# Patient Record
Sex: MALE | Race: WHITE | NOT HISPANIC OR LATINO | URBAN - METROPOLITAN AREA
[De-identification: names, ages, dates, MRNs, and addresses within clinical notes are randomized per-mention and may not be internally consistent; named-entity substitution may affect disease eponyms.]

---

## 2017-01-11 ENCOUNTER — COMMUNICATION - HEALTHEAST (OUTPATIENT)
Dept: FAMILY MEDICINE | Facility: CLINIC | Age: 52
End: 2017-01-11

## 2017-01-11 DIAGNOSIS — I10 HTN (HYPERTENSION): ICD-10-CM

## 2017-04-14 ENCOUNTER — COMMUNICATION - HEALTHEAST (OUTPATIENT)
Dept: FAMILY MEDICINE | Facility: CLINIC | Age: 52
End: 2017-04-14

## 2017-04-18 ENCOUNTER — COMMUNICATION - HEALTHEAST (OUTPATIENT)
Dept: FAMILY MEDICINE | Facility: CLINIC | Age: 52
End: 2017-04-18

## 2017-04-18 DIAGNOSIS — I10 ESSENTIAL HYPERTENSION: ICD-10-CM

## 2017-04-21 ENCOUNTER — COMMUNICATION - HEALTHEAST (OUTPATIENT)
Dept: SCHEDULING | Facility: CLINIC | Age: 52
End: 2017-04-21

## 2017-04-21 DIAGNOSIS — I10 HTN (HYPERTENSION): ICD-10-CM

## 2017-05-09 ENCOUNTER — COMMUNICATION - HEALTHEAST (OUTPATIENT)
Dept: FAMILY MEDICINE | Facility: CLINIC | Age: 52
End: 2017-05-09

## 2017-08-07 ENCOUNTER — COMMUNICATION - HEALTHEAST (OUTPATIENT)
Dept: FAMILY MEDICINE | Facility: CLINIC | Age: 52
End: 2017-08-07

## 2017-10-16 ENCOUNTER — OFFICE VISIT - HEALTHEAST (OUTPATIENT)
Dept: FAMILY MEDICINE | Facility: CLINIC | Age: 52
End: 2017-10-16

## 2017-10-16 DIAGNOSIS — D48.5 NEOPLASM OF UNCERTAIN BEHAVIOR OF SKIN: ICD-10-CM

## 2017-10-16 DIAGNOSIS — I10 ACCELERATED HYPERTENSION: ICD-10-CM

## 2017-10-16 DIAGNOSIS — Z83.49 FAMILY HISTORY OF DIABETES INSIPIDUS: ICD-10-CM

## 2017-10-16 DIAGNOSIS — E55.9 VITAMIN D DEFICIENCY: ICD-10-CM

## 2017-10-16 DIAGNOSIS — Z00.00 WELL ADULT EXAM: ICD-10-CM

## 2017-10-16 DIAGNOSIS — Z12.5 SCREENING FOR PROSTATE CANCER: ICD-10-CM

## 2017-10-16 DIAGNOSIS — D23.9 DYSPLASTIC NEVUS: ICD-10-CM

## 2017-10-16 DIAGNOSIS — I10 ESSENTIAL HYPERTENSION: ICD-10-CM

## 2017-10-16 LAB
HBA1C MFR BLD: 5.6 % (ref 3.5–6)
PSA SERPL-MCNC: 1.5 NG/ML (ref 0–3.5)

## 2017-10-16 RX ORDER — CHLORAL HYDRATE 500 MG
1000 CAPSULE ORAL DAILY
Status: SHIPPED | COMMUNITY
Start: 2017-10-16

## 2017-10-16 RX ORDER — MULTIVITAMIN WITH IRON
TABLET ORAL 2 TIMES DAILY
Status: SHIPPED | COMMUNITY
Start: 2017-10-16

## 2017-10-16 RX ORDER — NIACIN 500 MG
500 TABLET ORAL
Status: SHIPPED | COMMUNITY
Start: 2017-10-16

## 2017-10-16 RX ORDER — UBIDECARENONE 200 MG
200 CAPSULE ORAL 2 TIMES DAILY
Status: SHIPPED | COMMUNITY
Start: 2017-10-16

## 2017-10-16 ASSESSMENT — MIFFLIN-ST. JEOR: SCORE: 2042.81

## 2017-10-18 ENCOUNTER — COMMUNICATION - HEALTHEAST (OUTPATIENT)
Dept: FAMILY MEDICINE | Facility: CLINIC | Age: 52
End: 2017-10-18

## 2017-10-19 ENCOUNTER — COMMUNICATION - HEALTHEAST (OUTPATIENT)
Dept: FAMILY MEDICINE | Facility: CLINIC | Age: 52
End: 2017-10-19

## 2017-10-19 ENCOUNTER — AMBULATORY - HEALTHEAST (OUTPATIENT)
Dept: FAMILY MEDICINE | Facility: CLINIC | Age: 52
End: 2017-10-19

## 2017-10-19 DIAGNOSIS — D48.5 NEOPLASM OF UNCERTAIN BEHAVIOR OF SKIN: ICD-10-CM

## 2017-10-20 ENCOUNTER — AMBULATORY - HEALTHEAST (OUTPATIENT)
Dept: FAMILY MEDICINE | Facility: CLINIC | Age: 52
End: 2017-10-20

## 2017-10-20 ENCOUNTER — COMMUNICATION - HEALTHEAST (OUTPATIENT)
Dept: FAMILY MEDICINE | Facility: CLINIC | Age: 52
End: 2017-10-20

## 2017-10-20 DIAGNOSIS — I10 ACCELERATED HYPERTENSION: ICD-10-CM

## 2017-10-23 ENCOUNTER — AMBULATORY - HEALTHEAST (OUTPATIENT)
Dept: FAMILY MEDICINE | Facility: CLINIC | Age: 52
End: 2017-10-23

## 2017-10-23 DIAGNOSIS — I10 ACCELERATED HYPERTENSION: ICD-10-CM

## 2017-10-25 ENCOUNTER — COMMUNICATION - HEALTHEAST (OUTPATIENT)
Dept: FAMILY MEDICINE | Facility: CLINIC | Age: 52
End: 2017-10-25

## 2017-10-27 ENCOUNTER — COMMUNICATION - HEALTHEAST (OUTPATIENT)
Dept: FAMILY MEDICINE | Facility: CLINIC | Age: 52
End: 2017-10-27

## 2017-11-02 ENCOUNTER — COMMUNICATION - HEALTHEAST (OUTPATIENT)
Dept: FAMILY MEDICINE | Facility: CLINIC | Age: 52
End: 2017-11-02

## 2017-11-08 ENCOUNTER — RECORDS - HEALTHEAST (OUTPATIENT)
Dept: ADMINISTRATIVE | Facility: OTHER | Age: 52
End: 2017-11-08

## 2017-11-12 ENCOUNTER — COMMUNICATION - HEALTHEAST (OUTPATIENT)
Dept: FAMILY MEDICINE | Facility: CLINIC | Age: 52
End: 2017-11-12

## 2017-11-13 ENCOUNTER — AMBULATORY - HEALTHEAST (OUTPATIENT)
Dept: FAMILY MEDICINE | Facility: CLINIC | Age: 52
End: 2017-11-13

## 2017-11-13 DIAGNOSIS — M54.50 LOW BACK PAIN: ICD-10-CM

## 2017-11-14 ENCOUNTER — COMMUNICATION - HEALTHEAST (OUTPATIENT)
Dept: FAMILY MEDICINE | Facility: CLINIC | Age: 52
End: 2017-11-14

## 2017-11-14 ENCOUNTER — RECORDS - HEALTHEAST (OUTPATIENT)
Dept: ADMINISTRATIVE | Facility: OTHER | Age: 52
End: 2017-11-14

## 2017-11-25 ENCOUNTER — COMMUNICATION - HEALTHEAST (OUTPATIENT)
Dept: FAMILY MEDICINE | Facility: CLINIC | Age: 52
End: 2017-11-25

## 2017-12-01 ENCOUNTER — COMMUNICATION - HEALTHEAST (OUTPATIENT)
Dept: FAMILY MEDICINE | Facility: CLINIC | Age: 52
End: 2017-12-01

## 2018-01-22 ENCOUNTER — COMMUNICATION - HEALTHEAST (OUTPATIENT)
Dept: FAMILY MEDICINE | Facility: CLINIC | Age: 53
End: 2018-01-22

## 2018-02-06 ENCOUNTER — HOSPITAL ENCOUNTER (OUTPATIENT)
Dept: PHYSICAL MEDICINE AND REHAB | Facility: CLINIC | Age: 53
Discharge: HOME | End: 2018-02-06
Attending: PHYSICIAN ASSISTANT

## 2018-02-06 DIAGNOSIS — G47.9 SLEEP DISTURBANCE: ICD-10-CM

## 2018-02-06 DIAGNOSIS — M54.50 LUMBALGIA: ICD-10-CM

## 2018-02-06 DIAGNOSIS — M79.18 MYOFASCIAL PAIN: ICD-10-CM

## 2018-02-06 DIAGNOSIS — M53.3 SI (SACROILIAC) PAIN: ICD-10-CM

## 2018-02-06 DIAGNOSIS — M54.16 LUMBAR RADICULITIS: ICD-10-CM

## 2018-02-09 ENCOUNTER — COMMUNICATION - HEALTHEAST (OUTPATIENT)
Dept: PHYSICAL MEDICINE AND REHAB | Facility: CLINIC | Age: 53
End: 2018-02-09

## 2018-02-09 DIAGNOSIS — M54.16 LUMBAR RADICULITIS: ICD-10-CM

## 2018-02-09 DIAGNOSIS — M54.50 LUMBALGIA: ICD-10-CM

## 2018-02-09 DIAGNOSIS — M53.3 DISORDER OF SI (SACROILIAC) JOINT: ICD-10-CM

## 2018-02-13 ENCOUNTER — COMMUNICATION - HEALTHEAST (OUTPATIENT)
Dept: FAMILY MEDICINE | Facility: CLINIC | Age: 53
End: 2018-02-13

## 2018-02-14 ENCOUNTER — AMBULATORY - HEALTHEAST (OUTPATIENT)
Dept: FAMILY MEDICINE | Facility: CLINIC | Age: 53
End: 2018-02-14

## 2018-02-14 DIAGNOSIS — E78.5 DYSLIPIDEMIA: ICD-10-CM

## 2018-02-14 DIAGNOSIS — Z91.89 AT RISK FOR CORONARY ARTERY DISEASE: ICD-10-CM

## 2018-02-14 DIAGNOSIS — I10 HYPERTENSION: ICD-10-CM

## 2018-03-08 ENCOUNTER — COMMUNICATION - HEALTHEAST (OUTPATIENT)
Dept: FAMILY MEDICINE | Facility: CLINIC | Age: 53
End: 2018-03-08

## 2018-03-12 ENCOUNTER — THERAPY VISIT (OUTPATIENT)
Dept: PHYSICAL THERAPY | Facility: CLINIC | Age: 53
End: 2018-03-12
Payer: COMMERCIAL

## 2018-03-12 DIAGNOSIS — M54.16 LUMBAR RADICULOPATHY: Primary | ICD-10-CM

## 2018-03-12 PROCEDURE — 97110 THERAPEUTIC EXERCISES: CPT | Mod: GP | Performed by: PHYSICAL THERAPIST

## 2018-03-12 PROCEDURE — 97161 PT EVAL LOW COMPLEX 20 MIN: CPT | Mod: GP | Performed by: PHYSICAL THERAPIST

## 2018-03-12 NOTE — LETTER
[facility] ATHLETIC [facility]  [address]  [place] [address]  [phone]    2018    Re: Herbert Elizalde   :   1965  MRN:  [id number]   REFERRING PHYSICIAN:   Nain Ramires    [facility] ATHLETIC [facility]    Date of Initial Evaluation:  2018  Visits:  Rxs Used: 1  Reason for Referral:  Lumbar radiculopathy    EVALUATION SUMMARY    [facility]  Subjective:  Patient is a 52 year old male presenting with rehab back hpi. The history is provided by the patient. No  was used.   Herbert Elizalde is a 52 year old male with a lumbar condition.  Condition occurred with:  Insidious onset.  Condition occurred: for unknown reasons.  This is a chronic condition  Patient is reporting chronic L sided low back pain that radiates to the L buttock and partially down the posterior thigh. States the pain became more frequent 2017. States he wakes up at night at least 2-3 times, sleeps on his back. Is able to sit for 1 hour, then adjusts due to pain radiating down the leg. Feels pain almost immediately upon standing. Occasionally reports pain while walking.     Taking gabapentin 2x/daily for the pain and muscle relaxer as needed..    Patient reports pain:  Lumbar spine left.  Radiates to:  Gluteals left and thigh left.  Pain is described as sharp and shooting and is constant and reported as 5/10.  Associated symptoms:  Loss of motion/stiffness. Pain is worse during the night.  Symptoms are exacerbated by sitting, standing, walking, lifting, bending and lying down and relieved by heat.  Since onset symptoms are gradually worsening.        General health as reported by patient is good.  Pertinent medical history includes:  High blood pressure and cancer.  Medical allergies: yes (Amoxacillian).  Other surgeries include:  No.  Current medications:  Muscle relaxants, high blood pressure medication and pain  medication.  Current occupation is Animal care.  Patient is working in normal job without restrictions.  Primary job tasks include:  Lifting, driving, repetitive tasks and other (pulling).  Barriers include:  None as reported by the patient.  Red flags:  None as reported by the patient.    Objective:  Standing Alignment:    Shoulder/UE:  Rounded shoulders  Lumbar:  Lordosis decr            Re: Herbert Elizalde   :   1965        Lumbar/SI Evaluation  ROM:  Arom wnl lumbar: ELOY: no change in symptoms, improved ROM; REIL: increased ROM,   AROM Lumbar:   Flexion:        Distal shins  Ext:                    15%   Side Bend:        Left:     Right:   Rotation:           Left:     Right:   Side Glide:        Left:     Right:   Strength: TrA Contraction: poor  Lumbar Myotomes:  normal  Neural Tension/Mobility:    Left side:  SLR w/DF positive.   Lumbar Palpation:    Tenderness present at Left:    PSIS    Assessment/Plan:    Patient is a 52 year old male with lumbar complaints.    Patient has the following significant findings with corresponding treatment plan.                Diagnosis 1:  L sided lumbar radiculopathy  Pain -  hot/cold therapy, manual therapy, education, directional preference exercise and home program  Decreased ROM/flexibility - manual therapy and therapeutic exercise  Decreased strength - therapeutic exercise and therapeutic activities  Impaired muscle performance - neuro re-education  Decreased function - therapeutic activities  Impaired posture - neuro re-education    Therapy Evaluation Codes:   1) History comprised of:   Personal factors that impact the plan of care:      Time since onset of symptoms.    Comorbidity factors that impact the plan of care are:      Cancer and High blood pressure.     Medications impacting care: High blood pressure, Muscle relaxant and Pain.  2) Examination of Body Systems comprised of:   Body structures and functions that impact the plan of care:      Lumbar  spine.   Activity limitations that impact the plan of care are:      Bending, Dressing, Lifting, Sitting, Standing, Walking, Sleeping and Laying down.  3) Clinical presentation characteristics are:   Stable/Uncomplicated.  4) Decision-Making    Low complexity using standardized patient assessment instrument and/or measureable assessment of functional outcome.  Cumulative Therapy Evaluation is: Low complexity.    Previous and current functional limitations:  (See Goal Flow Sheet for this information)    Short term and Long term goals: (See Goal Flow Sheet for this information)       Re: Herbert Elizalde   :   1965    Communication ability:  Patient appears to be able to clearly communicate and understand verbal and written communication and follow directions correctly.  Treatment Explanation - The following has been discussed with the patient:   RX ordered/plan of care  Anticipated outcomes  Possible risks and side effects  This patient would benefit from PT intervention to resume normal activities.   Rehab potential is good.    Frequency:  1 X week, once daily  Duration:  for 6 weeks  Discharge Plan:  Achieve all LTG.  Independent in home treatment program.  Reach maximal therapeutic benefit.          Thank you for your referral.    INQUIRIES  Therapist: Tere Padilla PT   [facility]  [address] [place] [address] [address] [place] [id number]  Phone: [phone]  Fax: [phone]

## 2018-03-12 NOTE — PROGRESS NOTES
[facility] Initial Evaluation  Subjective:  Patient is a 52 year old male presenting with rehab back hpi. The history is provided by the patient. No  was used.   Herbert Elizalde is a 52 year old male with a lumbar condition.  Condition occurred with:  Insidious onset.  Condition occurred: for unknown reasons.  This is a chronic condition  Patient is reporting chronic L sided low back pain that radiates to the L buttock and partially down the posterior thigh. States the pain became more frequent December 2017. States he wakes up at night at least 2-3 times, sleeps on his back. Is able to sit for 1 hour, then adjusts due to pain radiating down the leg. Feels pain almost immediately upon standing. Occasionally reports pain while walking.     Taking gabapentin 2x/daily for the pain and muscle relaxer as needed..    Patient reports pain:  Lumbar spine left.  Radiates to:  Gluteals left and thigh left.  Pain is described as sharp and shooting and is constant and reported as 5/10.  Associated symptoms:  Loss of motion/stiffness. Pain is worse during the night.  Symptoms are exacerbated by sitting, standing, walking, lifting, bending and lying down and relieved by heat.  Since onset symptoms are gradually worsening.        General health as reported by patient is good.  Pertinent medical history includes:  High blood pressure and cancer.  Medical allergies: yes (Amoxacillian).  Other surgeries include:  No.  Current medications:  Muscle relaxants, high blood pressure medication and pain medication.  Current occupation is Animal care.  Patient is working in normal job without restrictions.  Primary job tasks include:  Lifting, driving, repetitive tasks and other (pulling).    Barriers include:  None as reported by the patient.    Red flags:  None as reported by the patient.                        Objective:  Standing Alignment:      Shoulder/UE:  Rounded shoulders  Lumbar:  Lordosis  decr                           Lumbar/SI Evaluation  ROM:  Arom wnl lumbar: ELOY: no change in symptoms, improved ROM; REIL: increased ROM,   AROM Lumbar:   Flexion:        Distal shins  Ext:                    15%   Side Bend:        Left:     Right:   Rotation:           Left:     Right:   Side Glide:        Left:     Right:         Strength: TrA Contraction: poor  Lumbar Myotomes:  normal                  Neural Tension/Mobility:    Left side:  SLR w/DF positive.     Lumbar Palpation:    Tenderness present at Left:    PSIS                                                       General     ROS    Assessment/Plan:    Patient is a 52 year old male with lumbar complaints.    Patient has the following significant findings with corresponding treatment plan.                Diagnosis 1:  L sided lumbar radiculopathy  Pain -  hot/cold therapy, manual therapy, education, directional preference exercise and home program  Decreased ROM/flexibility - manual therapy and therapeutic exercise  Decreased strength - therapeutic exercise and therapeutic activities  Impaired muscle performance - neuro re-education  Decreased function - therapeutic activities  Impaired posture - neuro re-education    Therapy Evaluation Codes:   1) History comprised of:   Personal factors that impact the plan of care:      Time since onset of symptoms.    Comorbidity factors that impact the plan of care are:      Cancer and High blood pressure.     Medications impacting care: High blood pressure, Muscle relaxant and Pain.  2) Examination of Body Systems comprised of:   Body structures and functions that impact the plan of care:      Lumbar spine.   Activity limitations that impact the plan of care are:      Bending, Dressing, Lifting, Sitting, Standing, Walking, Sleeping and Laying down.  3) Clinical presentation characteristics are:   Stable/Uncomplicated.  4) Decision-Making    Low complexity using standardized patient assessment instrument and/or  measureable assessment of functional outcome.  Cumulative Therapy Evaluation is: Low complexity.    Previous and current functional limitations:  (See Goal Flow Sheet for this information)    Short term and Long term goals: (See Goal Flow Sheet for this information)     Communication ability:  Patient appears to be able to clearly communicate and understand verbal and written communication and follow directions correctly.  Treatment Explanation - The following has been discussed with the patient:   RX ordered/plan of care  Anticipated outcomes  Possible risks and side effects  This patient would benefit from PT intervention to resume normal activities.   Rehab potential is good.    Frequency:  1 X week, once daily  Duration:  for 6 weeks  Discharge Plan:  Achieve all LTG.  Independent in home treatment program.  Reach maximal therapeutic benefit.    Please refer to the daily flowsheet for treatment today, total treatment time and time spent performing 1:1 timed codes.

## 2018-03-16 ENCOUNTER — COMMUNICATION - HEALTHEAST (OUTPATIENT)
Dept: FAMILY MEDICINE | Facility: CLINIC | Age: 53
End: 2018-03-16

## 2018-03-27 ENCOUNTER — COMMUNICATION - HEALTHEAST (OUTPATIENT)
Dept: SCHEDULING | Facility: CLINIC | Age: 53
End: 2018-03-27

## 2018-03-27 ENCOUNTER — COMMUNICATION - HEALTHEAST (OUTPATIENT)
Dept: FAMILY MEDICINE | Facility: CLINIC | Age: 53
End: 2018-03-27

## 2018-03-27 DIAGNOSIS — I10 HTN (HYPERTENSION): ICD-10-CM

## 2018-04-28 ENCOUNTER — COMMUNICATION - HEALTHEAST (OUTPATIENT)
Dept: FAMILY MEDICINE | Facility: CLINIC | Age: 53
End: 2018-04-28

## 2018-05-01 ENCOUNTER — AMBULATORY - HEALTHEAST (OUTPATIENT)
Dept: FAMILY MEDICINE | Facility: CLINIC | Age: 53
End: 2018-05-01

## 2018-05-01 DIAGNOSIS — M79.673 FOOT PAIN: ICD-10-CM

## 2018-05-07 ENCOUNTER — RECORDS - HEALTHEAST (OUTPATIENT)
Dept: ADMINISTRATIVE | Facility: OTHER | Age: 53
End: 2018-05-07

## 2018-05-27 ENCOUNTER — COMMUNICATION - HEALTHEAST (OUTPATIENT)
Dept: PHYSICAL MEDICINE AND REHAB | Facility: CLINIC | Age: 53
End: 2018-05-27

## 2018-05-27 DIAGNOSIS — M54.50 LUMBALGIA: ICD-10-CM

## 2018-05-27 DIAGNOSIS — M53.3 SI (SACROILIAC) PAIN: ICD-10-CM

## 2018-05-27 DIAGNOSIS — M54.16 LUMBAR RADICULITIS: ICD-10-CM

## 2018-05-27 DIAGNOSIS — G47.9 SLEEP DISTURBANCE: ICD-10-CM

## 2018-05-27 DIAGNOSIS — M79.18 MYOFASCIAL PAIN: ICD-10-CM

## 2018-05-29 ENCOUNTER — COMMUNICATION - HEALTHEAST (OUTPATIENT)
Dept: FAMILY MEDICINE | Facility: CLINIC | Age: 53
End: 2018-05-29

## 2018-06-01 ENCOUNTER — OFFICE VISIT - HEALTHEAST (OUTPATIENT)
Dept: FAMILY MEDICINE | Facility: CLINIC | Age: 53
End: 2018-06-01

## 2018-06-01 DIAGNOSIS — I10 ESSENTIAL HYPERTENSION: ICD-10-CM

## 2018-06-01 DIAGNOSIS — M77.00 MEDIAL EPICONDYLITIS: ICD-10-CM

## 2018-06-01 DIAGNOSIS — I87.8 VENOUS STASIS: ICD-10-CM

## 2018-06-01 ASSESSMENT — MIFFLIN-ST. JEOR: SCORE: 2046.22

## 2018-06-26 ENCOUNTER — COMMUNICATION - HEALTHEAST (OUTPATIENT)
Dept: FAMILY MEDICINE | Facility: CLINIC | Age: 53
End: 2018-06-26

## 2018-06-27 ENCOUNTER — COMMUNICATION - HEALTHEAST (OUTPATIENT)
Dept: FAMILY MEDICINE | Facility: CLINIC | Age: 53
End: 2018-06-27

## 2018-06-27 DIAGNOSIS — I10 ESSENTIAL HYPERTENSION: ICD-10-CM

## 2018-08-07 PROBLEM — M54.16 LUMBAR RADICULOPATHY: Status: RESOLVED | Noted: 2018-03-12 | Resolved: 2018-08-07

## 2018-08-25 ENCOUNTER — COMMUNICATION - HEALTHEAST (OUTPATIENT)
Dept: FAMILY MEDICINE | Facility: CLINIC | Age: 53
End: 2018-08-25

## 2018-08-25 DIAGNOSIS — I10 HTN (HYPERTENSION): ICD-10-CM

## 2018-10-16 ENCOUNTER — COMMUNICATION - HEALTHEAST (OUTPATIENT)
Dept: FAMILY MEDICINE | Facility: CLINIC | Age: 53
End: 2018-10-16

## 2018-10-17 ENCOUNTER — COMMUNICATION - HEALTHEAST (OUTPATIENT)
Dept: FAMILY MEDICINE | Facility: CLINIC | Age: 53
End: 2018-10-17

## 2018-11-05 ENCOUNTER — RECORDS - HEALTHEAST (OUTPATIENT)
Dept: ADMINISTRATIVE | Facility: OTHER | Age: 53
End: 2018-11-05

## 2018-11-30 ENCOUNTER — OFFICE VISIT - HEALTHEAST (OUTPATIENT)
Dept: FAMILY MEDICINE | Facility: CLINIC | Age: 53
End: 2018-11-30

## 2018-11-30 DIAGNOSIS — Z12.5 SCREENING FOR PROSTATE CANCER: ICD-10-CM

## 2018-11-30 DIAGNOSIS — Z20.9 EXPOSURE TO POTENTIAL INFECTION: ICD-10-CM

## 2018-11-30 DIAGNOSIS — I10 ESSENTIAL HYPERTENSION: ICD-10-CM

## 2018-11-30 DIAGNOSIS — Z00.00 WELL ADULT EXAM: ICD-10-CM

## 2018-11-30 DIAGNOSIS — G47.9 SLEEP DISORDER: ICD-10-CM

## 2018-11-30 LAB
ALBUMIN SERPL-MCNC: 4.6 G/DL (ref 3.5–5)
ALBUMIN UR-MCNC: NEGATIVE MG/DL
ALP SERPL-CCNC: 71 U/L (ref 45–120)
ALT SERPL W P-5'-P-CCNC: 25 U/L (ref 0–45)
ANION GAP SERPL CALCULATED.3IONS-SCNC: 14 MMOL/L (ref 5–18)
APPEARANCE UR: CLEAR
AST SERPL W P-5'-P-CCNC: 22 U/L (ref 0–40)
BASOPHILS # BLD AUTO: 0.1 THOU/UL (ref 0–0.2)
BASOPHILS NFR BLD AUTO: 1 % (ref 0–2)
BILIRUB SERPL-MCNC: 0.8 MG/DL (ref 0–1)
BILIRUB UR QL STRIP: NEGATIVE
BUN SERPL-MCNC: 23 MG/DL (ref 8–22)
CALCIUM SERPL-MCNC: 10.7 MG/DL (ref 8.5–10.5)
CHLORIDE BLD-SCNC: 100 MMOL/L (ref 98–107)
CO2 SERPL-SCNC: 27 MMOL/L (ref 22–31)
COLOR UR AUTO: YELLOW
CREAT SERPL-MCNC: 0.96 MG/DL (ref 0.7–1.3)
CRP SERPL HS-MCNC: 3.7 MG/L (ref 0–3)
EOSINOPHIL # BLD AUTO: 0.3 THOU/UL (ref 0–0.4)
EOSINOPHIL NFR BLD AUTO: 3 % (ref 0–6)
ERYTHROCYTE [DISTWIDTH] IN BLOOD BY AUTOMATED COUNT: 12.2 % (ref 11–14.5)
GFR SERPL CREATININE-BSD FRML MDRD: >60 ML/MIN/1.73M2
GLUCOSE BLD-MCNC: 84 MG/DL (ref 70–125)
GLUCOSE UR STRIP-MCNC: NEGATIVE MG/DL
HBA1C MFR BLD: 5.7 % (ref 3.5–6)
HCT VFR BLD AUTO: 44.7 % (ref 40–54)
HGB BLD-MCNC: 14.9 G/DL (ref 14–18)
HGB UR QL STRIP: NEGATIVE
KETONES UR STRIP-MCNC: NEGATIVE MG/DL
LEUKOCYTE ESTERASE UR QL STRIP: NEGATIVE
LYMPHOCYTES # BLD AUTO: 1.3 THOU/UL (ref 0.8–4.4)
LYMPHOCYTES NFR BLD AUTO: 16 % (ref 20–40)
MCH RBC QN AUTO: 30.1 PG (ref 27–34)
MCHC RBC AUTO-ENTMCNC: 33.3 G/DL (ref 32–36)
MCV RBC AUTO: 90 FL (ref 80–100)
MONOCYTES # BLD AUTO: 0.5 THOU/UL (ref 0–0.9)
MONOCYTES NFR BLD AUTO: 7 % (ref 2–10)
NEUTROPHILS # BLD AUTO: 6 THOU/UL (ref 2–7.7)
NEUTROPHILS NFR BLD AUTO: 73 % (ref 50–70)
NITRATE UR QL: NEGATIVE
PH UR STRIP: 7 [PH] (ref 5–8)
PLATELET # BLD AUTO: 444 THOU/UL (ref 140–440)
PMV BLD AUTO: 6.8 FL (ref 7–10)
POTASSIUM BLD-SCNC: 4.2 MMOL/L (ref 3.5–5)
PROT SERPL-MCNC: 7.5 G/DL (ref 6–8)
PSA SERPL-MCNC: 1.7 NG/ML (ref 0–3.5)
RBC # BLD AUTO: 4.95 MILL/UL (ref 4.4–6.2)
SODIUM SERPL-SCNC: 141 MMOL/L (ref 136–145)
SP GR UR STRIP: 1.01 (ref 1–1.03)
TSH SERPL DL<=0.005 MIU/L-ACNC: 2.57 UIU/ML (ref 0.3–5)
UROBILINOGEN UR STRIP-ACNC: NORMAL
VIT B12 SERPL-MCNC: 670 PG/ML (ref 213–816)
WBC: 8.2 THOU/UL (ref 4–11)

## 2018-12-01 LAB — HCV AB SERPL QL IA: NEGATIVE

## 2018-12-02 LAB — DHEA-S SERPL-MCNC: 213 UG/DL (ref 70–310)

## 2018-12-03 LAB
25(OH)D3 SERPL-MCNC: 58.9 NG/ML (ref 30–80)
CHOLEST SERPL-MCNC: 214 MG/DL
HDL SERPL QN: 8.2 NM
HDL SERPL-SCNC: 32.4 UMOL/L
HDLC SERPL-MCNC: 39 MG/DL (ref 40–59)
HLD.LARGE SERPL-SCNC: ABNORMAL UMOL/L
LDL SERPL QN: 20.3 NM
LDL SERPL-SCNC: 1966 NMOL/L
LDL SMALL SERPL-SCNC: >1085 NMOL/L
LDLC SERPL CALC-MCNC: 138 MG/DL
LIPOPROTEIN (A) - HISTORICAL: 2 MG/DL (ref 0–30)
PATHOLOGY STUDY: ABNORMAL
TRIGL SERPL-MCNC: 185 MG/DL (ref 30–149)
VLDL LARGE SERPL-SCNC: 7.2 NMOL/L
VLDL SERPL QN: 54.7 NM

## 2018-12-04 LAB
FASTING STATUS PATIENT QL REPORTED: YES
HOMOCYSTEINE PLASMA - HISTORICAL: 11 UMOL/L (ref 0–13)

## 2018-12-05 ENCOUNTER — COMMUNICATION - HEALTHEAST (OUTPATIENT)
Dept: FAMILY MEDICINE | Facility: CLINIC | Age: 53
End: 2018-12-05

## 2018-12-06 ENCOUNTER — COMMUNICATION - HEALTHEAST (OUTPATIENT)
Dept: FAMILY MEDICINE | Facility: CLINIC | Age: 53
End: 2018-12-06

## 2018-12-31 ENCOUNTER — COMMUNICATION - HEALTHEAST (OUTPATIENT)
Dept: FAMILY MEDICINE | Facility: CLINIC | Age: 53
End: 2018-12-31

## 2018-12-31 DIAGNOSIS — I10 HTN (HYPERTENSION): ICD-10-CM

## 2019-11-28 ENCOUNTER — COMMUNICATION - HEALTHEAST (OUTPATIENT)
Dept: FAMILY MEDICINE | Facility: CLINIC | Age: 54
End: 2019-11-28

## 2019-11-28 DIAGNOSIS — I10 HTN (HYPERTENSION): ICD-10-CM

## 2019-12-03 ENCOUNTER — COMMUNICATION - HEALTHEAST (OUTPATIENT)
Dept: FAMILY MEDICINE | Facility: CLINIC | Age: 54
End: 2019-12-03

## 2019-12-03 DIAGNOSIS — I10 HTN (HYPERTENSION): ICD-10-CM

## 2020-02-26 ENCOUNTER — COMMUNICATION - HEALTHEAST (OUTPATIENT)
Dept: FAMILY MEDICINE | Facility: CLINIC | Age: 55
End: 2020-02-26

## 2020-02-26 DIAGNOSIS — I10 HTN (HYPERTENSION): ICD-10-CM

## 2020-05-27 ENCOUNTER — COMMUNICATION - HEALTHEAST (OUTPATIENT)
Dept: FAMILY MEDICINE | Facility: CLINIC | Age: 55
End: 2020-05-27

## 2020-05-27 DIAGNOSIS — I10 HTN (HYPERTENSION): ICD-10-CM

## 2020-08-25 ENCOUNTER — COMMUNICATION - HEALTHEAST (OUTPATIENT)
Dept: FAMILY MEDICINE | Facility: CLINIC | Age: 55
End: 2020-08-25

## 2020-08-25 DIAGNOSIS — I10 HTN (HYPERTENSION): ICD-10-CM

## 2020-10-28 ENCOUNTER — RECORDS - HEALTHEAST (OUTPATIENT)
Dept: ADMINISTRATIVE | Facility: OTHER | Age: 55
End: 2020-10-28

## 2020-11-12 ENCOUNTER — COMMUNICATION - HEALTHEAST (OUTPATIENT)
Dept: FAMILY MEDICINE | Facility: CLINIC | Age: 55
End: 2020-11-12

## 2020-11-25 ENCOUNTER — COMMUNICATION - HEALTHEAST (OUTPATIENT)
Dept: FAMILY MEDICINE | Facility: CLINIC | Age: 55
End: 2020-11-25

## 2020-11-25 DIAGNOSIS — I10 HTN (HYPERTENSION): ICD-10-CM

## 2020-12-09 ENCOUNTER — COMMUNICATION - HEALTHEAST (OUTPATIENT)
Dept: FAMILY MEDICINE | Facility: CLINIC | Age: 55
End: 2020-12-09

## 2020-12-09 DIAGNOSIS — I10 HTN (HYPERTENSION): ICD-10-CM

## 2020-12-10 RX ORDER — AMLODIPINE BESYLATE 10 MG/1
TABLET ORAL
Qty: 90 TABLET | Refills: 3 | Status: SHIPPED | OUTPATIENT
Start: 2020-12-10 | End: 2021-12-24

## 2021-02-23 ENCOUNTER — COMMUNICATION - HEALTHEAST (OUTPATIENT)
Dept: FAMILY MEDICINE | Facility: CLINIC | Age: 56
End: 2021-02-23

## 2021-02-23 DIAGNOSIS — I10 HTN (HYPERTENSION): ICD-10-CM

## 2021-03-12 ENCOUNTER — COMMUNICATION - HEALTHEAST (OUTPATIENT)
Dept: FAMILY MEDICINE | Facility: CLINIC | Age: 56
End: 2021-03-12

## 2021-03-12 DIAGNOSIS — I10 HTN (HYPERTENSION): ICD-10-CM

## 2021-03-30 ENCOUNTER — COMMUNICATION - HEALTHEAST (OUTPATIENT)
Dept: FAMILY MEDICINE | Facility: CLINIC | Age: 56
End: 2021-03-30

## 2021-03-30 DIAGNOSIS — I10 HTN (HYPERTENSION): ICD-10-CM

## 2021-04-02 ENCOUNTER — COMMUNICATION - HEALTHEAST (OUTPATIENT)
Dept: FAMILY MEDICINE | Facility: CLINIC | Age: 56
End: 2021-04-02

## 2021-04-09 ENCOUNTER — COMMUNICATION - HEALTHEAST (OUTPATIENT)
Dept: FAMILY MEDICINE | Facility: CLINIC | Age: 56
End: 2021-04-09

## 2021-04-09 ENCOUNTER — OFFICE VISIT - HEALTHEAST (OUTPATIENT)
Dept: FAMILY MEDICINE | Facility: CLINIC | Age: 56
End: 2021-04-09

## 2021-04-09 DIAGNOSIS — E66.01 MORBID OBESITY (H): ICD-10-CM

## 2021-04-09 DIAGNOSIS — Z11.4 SCREENING FOR HIV (HUMAN IMMUNODEFICIENCY VIRUS): ICD-10-CM

## 2021-04-09 DIAGNOSIS — R60.0 LOCALIZED EDEMA: ICD-10-CM

## 2021-04-09 DIAGNOSIS — E55.9 VITAMIN D DEFICIENCY: ICD-10-CM

## 2021-04-09 DIAGNOSIS — I10 HTN (HYPERTENSION): ICD-10-CM

## 2021-04-09 DIAGNOSIS — Z12.5 SCREENING FOR PROSTATE CANCER: ICD-10-CM

## 2021-04-09 DIAGNOSIS — Z11.59 ENCOUNTER FOR HEPATITIS C SCREENING TEST FOR LOW RISK PATIENT: ICD-10-CM

## 2021-04-09 DIAGNOSIS — Z13.1 SCREENING FOR DIABETES MELLITUS: ICD-10-CM

## 2021-04-09 DIAGNOSIS — I10 ESSENTIAL HYPERTENSION: ICD-10-CM

## 2021-04-09 DIAGNOSIS — M25.471 RIGHT ANKLE SWELLING: ICD-10-CM

## 2021-04-09 DIAGNOSIS — Z53.20 ANTENATAL SCREENING FOR HEPATITIS B VIRUS DECLINED: ICD-10-CM

## 2021-04-09 LAB
ALBUMIN SERPL-MCNC: 4.8 G/DL (ref 3.5–5)
ALBUMIN UR-MCNC: NEGATIVE G/DL
ALP SERPL-CCNC: 72 U/L (ref 45–120)
ALT SERPL W P-5'-P-CCNC: 19 U/L (ref 0–45)
ANION GAP SERPL CALCULATED.3IONS-SCNC: 13 MMOL/L (ref 5–18)
APPEARANCE UR: CLEAR
AST SERPL W P-5'-P-CCNC: 18 U/L (ref 0–40)
BILIRUB SERPL-MCNC: 0.8 MG/DL (ref 0–1)
BILIRUB UR QL STRIP: NEGATIVE
BUN SERPL-MCNC: 24 MG/DL (ref 8–22)
CALCIUM SERPL-MCNC: 10.7 MG/DL (ref 8.5–10.5)
CHLORIDE BLD-SCNC: 101 MMOL/L (ref 98–107)
CHOLEST SERPL-MCNC: 195 MG/DL
CO2 SERPL-SCNC: 28 MMOL/L (ref 22–31)
COLOR UR AUTO: YELLOW
CREAT SERPL-MCNC: 1.2 MG/DL (ref 0.7–1.3)
ERYTHROCYTE [DISTWIDTH] IN BLOOD BY AUTOMATED COUNT: 13.1 % (ref 11–14.5)
FASTING STATUS PATIENT QL REPORTED: YES
GFR SERPL CREATININE-BSD FRML MDRD: >60 ML/MIN/1.73M2
GLUCOSE BLD-MCNC: 81 MG/DL (ref 70–125)
GLUCOSE UR STRIP-MCNC: NEGATIVE MG/DL
HBA1C MFR BLD: 5.7 %
HCT VFR BLD AUTO: 48.2 % (ref 40–54)
HDLC SERPL-MCNC: 37 MG/DL
HGB BLD-MCNC: 15.8 G/DL (ref 14–18)
HGB UR QL STRIP: NEGATIVE
HIV 1+2 AB+HIV1 P24 AG SERPL QL IA: NEGATIVE
KETONES UR STRIP-MCNC: NEGATIVE MG/DL
LDLC SERPL CALC-MCNC: 121 MG/DL
LEUKOCYTE ESTERASE UR QL STRIP: NEGATIVE
MCH RBC QN AUTO: 29.4 PG (ref 27–34)
MCHC RBC AUTO-ENTMCNC: 32.8 G/DL (ref 32–36)
MCV RBC AUTO: 90 FL (ref 80–100)
NITRATE UR QL: NEGATIVE
PH UR STRIP: 6.5 [PH] (ref 5–8)
PLATELET # BLD AUTO: 501 THOU/UL (ref 140–440)
PMV BLD AUTO: 8.8 FL (ref 7–10)
POTASSIUM BLD-SCNC: 4.7 MMOL/L (ref 3.5–5)
PROT SERPL-MCNC: 7.7 G/DL (ref 6–8)
PSA SERPL-MCNC: 2.1 NG/ML (ref 0–3.5)
RBC # BLD AUTO: 5.37 MILL/UL (ref 4.4–6.2)
SODIUM SERPL-SCNC: 142 MMOL/L (ref 136–145)
SP GR UR STRIP: 1.02 (ref 1–1.03)
TRIGL SERPL-MCNC: 184 MG/DL
TSH SERPL DL<=0.005 MIU/L-ACNC: 3.5 UIU/ML (ref 0.3–5)
UROBILINOGEN UR STRIP-ACNC: NORMAL
WBC: 7.9 THOU/UL (ref 4–11)

## 2021-04-09 ASSESSMENT — MIFFLIN-ST. JEOR: SCORE: 2092.71

## 2021-04-10 LAB — NT-PROBNP SERPL-MCNC: 14 PG/ML (ref 0–125)

## 2021-04-12 LAB
25(OH)D3 SERPL-MCNC: 62.3 NG/ML (ref 30–80)
25(OH)D3 SERPL-MCNC: 62.3 NG/ML (ref 30–80)
HCV AB SERPL QL IA: NEGATIVE

## 2021-04-13 ENCOUNTER — COMMUNICATION - HEALTHEAST (OUTPATIENT)
Dept: FAMILY MEDICINE | Facility: CLINIC | Age: 56
End: 2021-04-13

## 2021-04-13 DIAGNOSIS — I10 HTN (HYPERTENSION): ICD-10-CM

## 2021-04-13 RX ORDER — CARVEDILOL 25 MG/1
TABLET ORAL
Qty: 180 TABLET | Refills: 3 | Status: SHIPPED | OUTPATIENT
Start: 2021-04-13 | End: 2022-02-15

## 2021-04-23 ENCOUNTER — AMBULATORY - HEALTHEAST (OUTPATIENT)
Dept: NURSING | Facility: CLINIC | Age: 56
End: 2021-04-23

## 2021-05-14 ENCOUNTER — AMBULATORY - HEALTHEAST (OUTPATIENT)
Dept: NURSING | Facility: CLINIC | Age: 56
End: 2021-05-14

## 2021-05-26 ENCOUNTER — RECORDS - HEALTHEAST (OUTPATIENT)
Dept: ADMINISTRATIVE | Facility: CLINIC | Age: 56
End: 2021-05-26

## 2021-05-31 VITALS — WEIGHT: 253.75 LBS | HEIGHT: 74 IN | BODY MASS INDEX: 32.57 KG/M2

## 2021-05-31 VITALS — WEIGHT: 255 LBS | BODY MASS INDEX: 33.19 KG/M2

## 2021-06-01 VITALS — BODY MASS INDEX: 34.19 KG/M2 | WEIGHT: 258 LBS | HEIGHT: 73 IN

## 2021-06-02 VITALS — BODY MASS INDEX: 34.28 KG/M2 | WEIGHT: 256.25 LBS

## 2021-06-03 NOTE — TELEPHONE ENCOUNTER
Refill Approved    Rx renewed per Medication Renewal Policy. Medication was last renewed on 12/31/2018.    Sonny Nunes, [facility] Triage/Med Refill 11/29/2019     Requested Prescriptions   Pending Prescriptions Disp Refills     carvedilol (COREG) 25 MG tablet [Pharmacy Med Name: CARVEDILOL TABS 25MG] 180 tablet 4     Sig: TAKE 1 TABLET TWICE A DAY WITH MEALS       Beta-Blockers Refill Protocol Passed - 11/28/2019  1:21 AM        Passed - PCP or prescribing provider visit in past 12 months or next 3 months     Last office visit with prescriber/PCP: 6/1/2018 Javid Sanchez MD OR same dept: Visit date not found OR same specialty: 6/1/2018 Javid Sanchez MD  Last physical: 11/30/2018 Last MTM visit: Visit date not found   Next visit within 3 mo: Visit date not found  Next physical within 3 mo: Visit date not found  Prescriber OR PCP: Javid Sanchez MD  Last diagnosis associated with med order: 1. HTN (hypertension)  - carvedilol (COREG) 25 MG tablet [Pharmacy Med Name: CARVEDILOL TABS 25MG]; TAKE 1 TABLET TWICE A DAY WITH MEALS  Dispense: 180 tablet; Refill: 4    If protocol passes may refill for 12 months if within 3 months of last provider visit (or a total of 15 months).             Passed - Blood pressure filed in past 12 months     BP Readings from Last 1 Encounters:   12/12/18 139/88

## 2021-06-04 NOTE — TELEPHONE ENCOUNTER
RN cannot approve Refill Request    RN can NOT refill this medication Protocol failed and NO refill given.      Angelina Berger, [facility] Triage/Med Refill 12/5/2019    Requested Prescriptions   Pending Prescriptions Disp Refills     valsartan-hydrochlorothiazide (DIOVAN-HCT) 320-25 mg per tablet [Pharmacy Med Name: VALSARTAN/HCTZ TABS 320/25] 90 tablet 4     Sig: TAKE 1 TABLET DAILY (NEW DOSE)       Diuretics/Combination Diuretics Refill Protocol  Failed - 12/3/2019 10:29 PM        Failed - Visit with PCP or prescribing provider visit in past 12 months     Last office visit with prescriber/PCP: 6/1/2018 Javid Sanchez MD OR same dept: Visit date not found OR same specialty: 6/1/2018 Javid Sanchez MD  Last physical: 11/30/2018 Last MTM visit: Visit date not found   Next visit within 3 mo: Visit date not found  Next physical within 3 mo: Visit date not found  Prescriber OR PCP: Javid Sanchez MD  Last diagnosis associated with med order: 1. HTN (hypertension)  - valsartan-hydrochlorothiazide (DIOVAN-HCT) 320-25 mg per tablet [Pharmacy Med Name: VALSARTAN/HCTZ TABS 320/25]; TAKE 1 TABLET DAILY (NEW DOSE)  Dispense: 90 tablet; Refill: 4  - amLODIPine (NORVASC) 10 MG tablet [Pharmacy Med Name: AMLODIPINE BESYLATE TABS 10MG]; TAKE 1 TABLET DAILY  Dispense: 90 tablet; Refill: 4    If protocol passes may refill for 12 months if within 3 months of last provider visit (or a total of 15 months).             Failed - Serum Potassium in past 12 months      No results found for: LN-POTASSIUM          Failed - Serum Sodium in past 12 months      No results found for: LN-SODIUM          Failed - Serum Creatinine in past 12 months      Creatinine   Date Value Ref Range Status   11/30/2018 0.96 0.70 - 1.30 mg/dL Final             Passed - Blood pressure on file in past 12 months     BP Readings from Last 1 Encounters:   12/12/18 139/88             amLODIPine (NORVASC) 10 MG tablet [Pharmacy Med Name: AMLODIPINE  BESYLATE TABS 10MG] 90 tablet 4     Sig: TAKE 1 TABLET DAILY       Calcium-Channel Blockers Protocol Failed - 12/3/2019 10:29 PM        Failed - PCP or prescribing provider visit in past 12 months or next 3 months     Last office visit with prescriber/PCP: 6/1/2018 Javid Sanchez MD OR same dept: Visit date not found OR same specialty: 6/1/2018 Javid Sanchez MD  Last physical: 11/30/2018 Last MTM visit: Visit date not found   Next visit within 3 mo: Visit date not found  Next physical within 3 mo: Visit date not found  Prescriber OR PCP: Javid Sanchez MD  Last diagnosis associated with med order: 1. HTN (hypertension)  - valsartan-hydrochlorothiazide (DIOVAN-HCT) 320-25 mg per tablet [Pharmacy Med Name: VALSARTAN/HCTZ TABS 320/25]; TAKE 1 TABLET DAILY (NEW DOSE)  Dispense: 90 tablet; Refill: 4  - amLODIPine (NORVASC) 10 MG tablet [Pharmacy Med Name: AMLODIPINE BESYLATE TABS 10MG]; TAKE 1 TABLET DAILY  Dispense: 90 tablet; Refill: 4    If protocol passes may refill for 12 months if within 3 months of last provider visit (or a total of 15 months).             Passed - Blood pressure filed in past 12 months     BP Readings from Last 1 Encounters:   12/12/18 139/88

## 2021-06-05 VITALS
HEIGHT: 73 IN | DIASTOLIC BLOOD PRESSURE: 88 MMHG | HEART RATE: 83 BPM | SYSTOLIC BLOOD PRESSURE: 138 MMHG | OXYGEN SATURATION: 96 % | BODY MASS INDEX: 35.55 KG/M2 | WEIGHT: 268.25 LBS

## 2021-06-06 NOTE — TELEPHONE ENCOUNTER
RN cannot approve Refill Request    RN can NOT refill this medication Protocol failed and NO refill given.       Angelina Berger, [facility] Triage/Med Refill 2/26/2020    Requested Prescriptions   Pending Prescriptions Disp Refills     carvediloL (COREG) 25 MG tablet [Pharmacy Med Name: CARVEDILOL TABS 25MG] 180 tablet 4     Sig: TAKE 1 TABLET TWICE A DAY WITH MEALS       Beta-Blockers Refill Protocol Failed - 2/26/2020  1:20 AM        Failed - PCP or prescribing provider visit in past 12 months or next 3 months     Last office visit with prescriber/PCP: 6/1/2018 Javid Sanchez MD OR same dept: Visit date not found OR same specialty: 6/1/2018 Javid Sanchez MD  Last physical: 11/30/2018 Last MTM visit: Visit date not found   Next visit within 3 mo: Visit date not found  Next physical within 3 mo: Visit date not found  Prescriber OR PCP: Javid Sanchez MD  Last diagnosis associated with med order: 1. HTN (hypertension)  - carvediloL (COREG) 25 MG tablet [Pharmacy Med Name: CARVEDILOL TABS 25MG]; TAKE 1 TABLET TWICE A DAY WITH MEALS  Dispense: 180 tablet; Refill: 4    If protocol passes may refill for 12 months if within 3 months of last provider visit (or a total of 15 months).             Failed - Blood pressure filed in past 12 months     BP Readings from Last 1 Encounters:   12/12/18 139/88

## 2021-06-08 NOTE — TELEPHONE ENCOUNTER
RN cannot approve Refill Request    RN can NOT refill this medication PCP messaged that patient is overdue for Labs and Office Visit. Last office visit: 6/1/2018 Javid Sanchez MD Last Physical: 11/30/2018 Last MTM visit: Visit date not found Last visit same specialty: 6/1/2018 Javid Sanchez MD.  Next visit within 3 mo: Visit date not found  Next physical within 3 mo: Visit date not found      Caroline Gomez, [facility] Triage/Med Refill 5/30/2020    Requested Prescriptions   Pending Prescriptions Disp Refills     carvediloL (COREG) 25 MG tablet [Pharmacy Med Name: CARVEDILOL TABS 25MG] 180 tablet 3     Sig: TAKE 1 TABLET TWICE A DAY WITH MEALS (OVERDUE FOR VISIT)       Beta-Blockers Refill Protocol Failed - 5/27/2020  2:22 AM        Failed - PCP or prescribing provider visit in past 12 months or next 3 months     Last office visit with prescriber/PCP: 6/1/2018 Javid Sanchez MD OR same dept: Visit date not found OR same specialty: 6/1/2018 Javid Sanchez MD  Last physical: 11/30/2018 Last MTM visit: Visit date not found   Next visit within 3 mo: Visit date not found  Next physical within 3 mo: Visit date not found  Prescriber OR PCP: Javid Sanchez MD  Last diagnosis associated with med order: 1. HTN (hypertension)  - carvediloL (COREG) 25 MG tablet [Pharmacy Med Name: CARVEDILOL TABS 25MG]; TAKE 1 TABLET TWICE A DAY WITH MEALS (OVERDUE FOR VISIT)  Dispense: 180 tablet; Refill: 3    If protocol passes may refill for 12 months if within 3 months of last provider visit (or a total of 15 months).             Failed - Blood pressure filed in past 12 months     BP Readings from Last 1 Encounters:   12/12/18 139/88

## 2021-06-10 NOTE — TELEPHONE ENCOUNTER
RN cannot approve Refill Request    RN can NOT refill this medication Protocol failed and NO refill given. Last office visit: 6/1/2018 Javid Sanchez MD Last Physical: 11/30/2018 Last MTM visit: Visit date not found Last visit same specialty: 6/1/2018 Javid Sanchez MD.  Next visit within 3 mo: Visit date not found  Next physical within 3 mo: Visit date not found      Angelina Berger, [facility] Triage/Med Refill 8/27/2020    Requested Prescriptions   Pending Prescriptions Disp Refills     carvediloL (COREG) 25 MG tablet [Pharmacy Med Name: CARVEDILOL TABS 25MG] 180 tablet 3     Sig: TAKE 1 TABLET TWICE A DAY WITH MEALS (OVERDUE FOR VISIT 6/1/2020)       Beta-Blockers Refill Protocol Failed - 8/25/2020  2:22 AM        Failed - PCP or prescribing provider visit in past 12 months or next 3 months     Last office visit with prescriber/PCP: 6/1/2018 Javid Sanchez MD OR same dept: Visit date not found OR same specialty: 6/1/2018 Javid Sacnhez MD  Last physical: 11/30/2018 Last MTM visit: Visit date not found   Next visit within 3 mo: Visit date not found  Next physical within 3 mo: Visit date not found  Prescriber OR PCP: Javid Sanchez MD  Last diagnosis associated with med order: 1. HTN (hypertension)  - carvediloL (COREG) 25 MG tablet [Pharmacy Med Name: CARVEDILOL TABS 25MG]; TAKE 1 TABLET TWICE A DAY WITH MEALS (OVERDUE FOR VISIT 6/1/2020)  Dispense: 180 tablet; Refill: 3    If protocol passes may refill for 12 months if within 3 months of last provider visit (or a total of 15 months).             Failed - Blood pressure filed in past 12 months     BP Readings from Last 1 Encounters:   12/12/18 139/88

## 2021-06-13 NOTE — PROGRESS NOTES
ASSESSMENT & PLAN      Herbert was seen today for annual exam.    Diagnoses and all orders for this visit:    Well adult exam  -     NMR with Lipid  -     Comprehensive Metabolic Panel  -     Thyroid Cascade  -     C -Reactive Protein, High Sensitivity  -     HM1(CBC and Differential)  -     Cancel: Urinalysis-UC if Indicated  -     Glycosylated Hemoglobin A1c  -     HM1 (CBC with Diff)    Essential hypertension  -     Discontinue: amLODIPine (NORVASC) 10 MG tablet; TAKE 1 TABLET BY MOUTH EVERY DAY  -     NMR with Lipid  -     Comprehensive Metabolic Panel  -     Urinalysis-UC if Indicated    Family history of diabetes insipidus    Accelerated hypertension  -     Ambulatory referral to Interventional Radiology    Screening for prostate cancer  -     PSA (Prostatic-Specific Antigen), Annual Screen    Dysplastic nevus  -     Ambulatory referral to Dermatology    Neoplasm of uncertain behavior of skin  -     Ambulatory referral to Dermatology    Vitamin D deficiency  -     Cancel: Vitamin D, Total (25-Hydroxy)    Other orders  -     Influenza, Seasonal Quad, Preservative Free 36+ Months  -     Tdap vaccine greater than or equal to 6yo IM  -     amLODIPine-benazepril (LOTREL) 10-20 mg per capsule; Take 1 capsule by mouth daily.        No Follow-up on file.           CHIEF COMPLAINT: Herbert Elizalde had concerns including Annual Exam.    [place]: 1.............. had concerns including Annual Exam.    1. Well adult exam    2. Essential hypertension    3. Family history of diabetes insipidus    4. Accelerated hypertension    5. Screening for prostate cancer    6. Dysplastic nevus    7. Neoplasm of uncertain behavior of skin    8. Vitamin D deficiency      No problem-specific Assessment & Plan notes found for this encounter.      CC:              Hypertension, skin changes, feeling better  Past surgical history tonsillectomy as well as neck surgery for squamous cell carcinoma  2 kids in college doing well  Lives with his  partner  Stress is better              SUBJECTIVE:  Herbert Elizalde is a 52 y.o. male    Past Medical History:   Diagnosis Date     Squamous cell carcinoma of head and neck      Throat cancer      Past Surgical History:   Procedure Laterality Date     SQUAMOUS CELL CARCINOMA EXCISION Right     NECK  2012   [place]     TONSILLECTOMY       Amoxicillin  Current Outpatient Prescriptions   Medication Sig Dispense Refill     alpha lipoic acid 200 mg cap Take 400 mg by mouth.       ascorbic acid, vitamin C, (VITAMIN C) 1000 MG tablet Take 1,000 mg by mouth daily.       carvedilol (COREG) 12.5 MG tablet TAKE 1 TABLET BY MOUTH TWICE DAILY WITH MEALS 180 tablet 1     cholecalciferol, vitamin D3, (VITAMIN D3) 5,000 unit Tab Take by mouth 2 (two) times a day.       coenzyme Q10 (CO Q-10) 200 mg capsule Take 200 mg by mouth 2 (two) times a day.       magnesium 250 mg Tab Take by mouth 2 (two) times a day.       melatonin 5 mg cap Take by mouth.       multivitamin therapeutic (THERAGRAN) tablet Take 1 tablet by mouth daily.       niacin 500 MG tablet Take 500 mg by mouth.       OMEGA-3/DHA/EPA/FISH OIL (FISH OIL-OMEGA-3 FATTY ACIDS) 300-1,000 mg capsule Take 1,000 mg by mouth daily.       omeprazole (PRILOSEC) 40 MG capsule Take 40 mg by mouth daily.       PHYTOSTEROL COMBINATION NO.1 (CHOLEST CARE ORAL) Take 800 mg by mouth.       UNABLE TO FIND Med Name: PHYTOMEGA 400 MG BID       amLODIPine-benazepril (LOTREL) 10-20 mg per capsule Take 1 capsule by mouth daily. 90 capsule 3     No current facility-administered medications for this visit.      Family History   Problem Relation Age of Onset     Cancer Mother 74     OVARIAN     Diabetes Father      Heart disease Father      Anxiety disorder Brother      Hyperlipidemia Brother      Social History     Social History     Marital status: Life Partner     Spouse name: N/A     Number of children: N/A     Years of education: N/A     Social History Main Topics     Smoking status: Former  "Smoker     Types: Cigarettes     Quit date: 8/6/2001     Smokeless tobacco: Never Used     Alcohol use No     Drug use: No     Sexual activity: Not Currently     Partners: Male     Birth control/ protection: None     Other Topics Concern     None     Social History Narrative     Patient Active Problem List   Diagnosis     HTN (hypertension)     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 His smoking use included Cigarettes. He has never used smokeless tobacco. He reports that he does not drink alcohol or use illicit drugs.    REVIEW OF SYSTEMS:   Family history not pertinent to chief complaint or presenting problem    Review of systems otherwise negative as requested from patient, except   Those positive ROS outlined and discussed in [place].    OBJECTIVE:  BP (!) 130/96 (Patient Site: Left Arm, Patient Position: Sitting, Cuff Size: Adult Large)  Pulse 90  Resp 16  Ht 6' 1.5\" (1.867 m)  Wt (!) 253 lb 12 oz (115.1 kg)  SpO2 97%  BMI 33.02 kg/m2    GENERAL:     No acute distress.   Alert and oriented X 3         Physical:    Right nasal skin is rough measures approximately 8 mm x 4 mm actinic keratosis versus squamous cell carcinoma  TMs are clear  Oropharynx clear  His no palpable cervical or subclavicular nodes  Thyroid prep nontender without nodules  Lungs are clear  Cardiac S1-S2 regular sinus appreciable murmur abdomen soft obese nontender  Back speckled nevus 8 mm x 8 mm by pigment with normal borders  Multiple seborrheic keratoses  Multiple hemangiomas  Lower extremities without edema  He refused a prostate exam  No joint effusions normal distal pulses      ASSESSMENT & PLAN      Herbert was seen today for annual exam.    Diagnoses and all orders for this visit:    Well adult exam  -     NMR with Lipid  -     Comprehensive Metabolic Panel  -     Thyroid Cascade  -     C -Reactive Protein, High " Sensitivity  -     HM1(CBC and Differential)  -     Cancel: Urinalysis-UC if Indicated  -     Glycosylated Hemoglobin A1c  -     HM1 (CBC with Diff)    Essential hypertension  -     Discontinue: amLODIPine (NORVASC) 10 MG tablet; TAKE 1 TABLET BY MOUTH EVERY DAY  -     NMR with Lipid  -     Comprehensive Metabolic Panel  -     Urinalysis-UC if Indicated    Family history of diabetes insipidus    Accelerated hypertension  -     Ambulatory referral to Interventional Radiology    Screening for prostate cancer  -     PSA (Prostatic-Specific Antigen), Annual Screen    Dysplastic nevus  -     Ambulatory referral to Dermatology    Neoplasm of uncertain behavior of skin  -     Ambulatory referral to Dermatology    Vitamin D deficiency  -     Cancel: Vitamin D, Total (25-Hydroxy)    Other orders  -     Influenza, Seasonal Quad, Preservative Free 36+ Months  -     Tdap vaccine greater than or equal to 6yo IM  -     amLODIPine-benazepril (LOTREL) 10-20 mg per capsule; Take 1 capsule by mouth daily.    Given his accelerated hypertension we talked about fibromuscular dysplasia and renal artery stenosis will have him see interventional radiology for evaluation  I would also like him to see dermatology he has a possible skin cancer in his right nasal tip as well as a dysplastic-looking nevus of his back he wishes sequential dermatology referral was made  He will continue to stay active he has no symptoms of sleep apneaSleep is been improved with use of melatonin    No Follow-up on file.       Anticipatory Guidance and Symptomatic Cares Discussed   Advised to call back directly if there are further questions, or if these symptoms fail to improve as anticipated or worsen.  Return to clinic if patient has a clinical concern that warrants an exam.        30  Min Total Time, > 50% counseling and coordination of Care    Javid Sanchez MD  Family Medicine   [facility] [id number] [phone]

## 2021-06-13 NOTE — TELEPHONE ENCOUNTER
RN cannot approve Refill Request    RN can NOT refill this medication Protocol failed and NO refill given. Last office visit: 6/1/2018 Javid Sanchez MD Last Physical: 11/30/2018 Last MTM visit: Visit date not found Last visit same specialty: 6/1/2018 Javid Sanchez MD.  Next visit within 3 mo: Visit date not found  Next physical within 3 mo: Visit date not found      Angelina Berger, [facility] Connection Triage/Med Refill 12/10/2020    Requested Prescriptions   Pending Prescriptions Disp Refills     amLODIPine (NORVASC) 10 MG tablet [Pharmacy Med Name: AMLODIPINE BESYLATE TABS 10MG] 90 tablet 3     Sig: TAKE 1 TABLET DAILY       Calcium-Channel Blockers Protocol Failed - 12/9/2020  1:03 AM        Failed - PCP or prescribing provider visit in past 12 months or next 3 months     Last office visit with prescriber/PCP: 6/1/2018 Jvaid Sanchez MD OR same dept: Visit date not found OR same specialty: 6/1/2018 Javid Sanchez MD  Last physical: 11/30/2018 Last MTM visit: Visit date not found   Next visit within 3 mo: Visit date not found  Next physical within 3 mo: Visit date not found  Prescriber OR PCP: Javid Sanchez MD  Last diagnosis associated with med order: 1. HTN (hypertension)  - amLODIPine (NORVASC) 10 MG tablet [Pharmacy Med Name: AMLODIPINE BESYLATE TABS 10MG]; TAKE 1 TABLET DAILY  Dispense: 90 tablet; Refill: 3  - DIOVAN -25 mg per tablet [Pharmacy Med Name: DIOVAN HCT TABS 320/25MG]; TAKE 1 TABLET DAILY (NEW DOSE)  Dispense: 90 tablet; Refill: 3    If protocol passes may refill for 12 months if within 3 months of last provider visit (or a total of 15 months).             Failed - Blood pressure filed in past 12 months     BP Readings from Last 1 Encounters:   12/12/18 139/88                DIOVAN -25 mg per tablet [Pharmacy Med Name: DIOVAN HCT TABS 320/25MG] 90 tablet 3     Sig: TAKE 1 TABLET DAILY (NEW DOSE)       Diuretics/Combination Diuretics Refill Protocol  Failed - 12/9/2020   1:03 AM        Failed - Visit with PCP or prescribing provider visit in past 12 months     Last office visit with prescriber/PCP: 6/1/2018 Javid Sanchez MD OR same dept: Visit date not found OR same specialty: 6/1/2018 Javid Sanchez MD  Last physical: 11/30/2018 Last MTM visit: Visit date not found   Next visit within 3 mo: Visit date not found  Next physical within 3 mo: Visit date not found  Prescriber OR PCP: Javid Sanchez MD  Last diagnosis associated with med order: 1. HTN (hypertension)  - amLODIPine (NORVASC) 10 MG tablet [Pharmacy Med Name: AMLODIPINE BESYLATE TABS 10MG]; TAKE 1 TABLET DAILY  Dispense: 90 tablet; Refill: 3  - DIOVAN -25 mg per tablet [Pharmacy Med Name: DIOVAN HCT TABS 320/25MG]; TAKE 1 TABLET DAILY (NEW DOSE)  Dispense: 90 tablet; Refill: 3    If protocol passes may refill for 12 months if within 3 months of last provider visit (or a total of 15 months).             Failed - Serum Potassium in past 12 months      No results found for: LN-POTASSIUM          Failed - Serum Sodium in past 12 months      No results found for: LN-SODIUM          Failed - Blood pressure on file in past 12 months     BP Readings from Last 1 Encounters:   12/12/18 139/88             Failed - Serum Creatinine in past 12 months      Creatinine   Date Value Ref Range Status   11/30/2018 0.96 0.70 - 1.30 mg/dL Final

## 2021-06-13 NOTE — TELEPHONE ENCOUNTER
RN cannot approve Refill Request    RN can NOT refill this medication Protocol failed and NO refill given. Last office visit: 6/1/2018 Javid Sanchez MD Last Physical: 11/30/2018 Last MTM visit: Visit date not found Last visit same specialty: 6/1/2018 Javid Sanchez MD.  Next visit within 3 mo: Visit date not found  Next physical within 3 mo: Visit date not found      Juanita Rubalcava, Care Connection Triage/Med Refill 11/25/2020    Requested Prescriptions   Pending Prescriptions Disp Refills     carvediloL (COREG) 25 MG tablet [Pharmacy Med Name: CARVEDILOL TABS 25MG] 180 tablet 3     Sig: TAKE 1 TABLET TWICE A DAY WITH MEALS (OVERDUE FOR VISIT 6/1/2020)       Beta-Blockers Refill Protocol Failed - 11/25/2020  1:02 AM        Failed - PCP or prescribing provider visit in past 12 months or next 3 months     Last office visit with prescriber/PCP: 6/1/2018 Javid Sanchez MD OR same dept: Visit date not found OR same specialty: 6/1/2018 Javid Sanchez MD  Last physical: 11/30/2018 Last MTM visit: Visit date not found   Next visit within 3 mo: Visit date not found  Next physical within 3 mo: Visit date not found  Prescriber OR PCP: Javid Sanchez MD  Last diagnosis associated with med order: 1. HTN (hypertension)  - carvediloL (COREG) 25 MG tablet [Pharmacy Med Name: CARVEDILOL TABS 25MG]; TAKE 1 TABLET TWICE A DAY WITH MEALS (OVERDUE FOR VISIT 6/1/2020)  Dispense: 180 tablet; Refill: 3    If protocol passes may refill for 12 months if within 3 months of last provider visit (or a total of 15 months).             Failed - Blood pressure filed in past 12 months     BP Readings from Last 1 Encounters:   12/12/18 139/88

## 2021-06-15 NOTE — TELEPHONE ENCOUNTER
RN cannot approve Refill Request    RN can NOT refill this medication PCP messaged that patient is overdue for Office Visit. Last office visit: 6/1/2018 Javid Sanchez MD Last Physical: 11/30/2018 Last MTM visit: Visit date not found Last visit same specialty: 6/1/2018 Javid Sanchez MD.  Next visit within 3 mo: Visit date not found  Next physical within 3 mo: Visit date not found      Armando Del Valle, Care Connection Triage/Med Refill 3/12/2021    Requested Prescriptions   Pending Prescriptions Disp Refills     carvediloL (COREG) 25 MG tablet [Pharmacy Med Name: CARVEDILOL TABS 25MG] 60 tablet 11     Sig: TAKE 1 TABLET TWICE A DAY WITH MEALS (OVERDUE FOR VISIT 6/1/2020, NEED APPOINTMENT PRIOR TO ADDITIONAL REFILLS)       Beta-Blockers Refill Protocol Failed - 3/12/2021  9:19 AM        Failed - PCP or prescribing provider visit in past 12 months or next 3 months     Last office visit with prescriber/PCP: 6/1/2018 Javid Sanchez MD OR same dept: Visit date not found OR same specialty: 6/1/2018 Javid Sanchez MD  Last physical: 11/30/2018 Last MTM visit: Visit date not found   Next visit within 3 mo: Visit date not found  Next physical within 3 mo: Visit date not found  Prescriber OR PCP: Javid Sanchez MD  Last diagnosis associated with med order: 1. HTN (hypertension)  - carvediloL (COREG) 25 MG tablet [Pharmacy Med Name: CARVEDILOL TABS 25MG]; TAKE 1 TABLET TWICE A DAY WITH MEALS (OVERDUE FOR VISIT 6/1/2020, NEED APPOINTMENT PRIOR TO ADDITIONAL REFILLS)  Dispense: 60 tablet; Refill: 11    If protocol passes may refill for 12 months if within 3 months of last provider visit (or a total of 15 months).             Failed - Blood pressure filed in past 12 months     BP Readings from Last 1 Encounters:   12/12/18 139/88

## 2021-06-15 NOTE — PROGRESS NOTES
ASSESSMENT:     Diagnoses and all orders for this visit:    Lumbalgia  -     Ambulatory referral to Physical Therapy  -     gabapentin (NEURONTIN) 300 MG capsule; Take 1 capsule (300 mg total) by mouth 3 (three) times a day.  Dispense: 90 capsule; Refill: 2  -     cyclobenzaprine (FLEXERIL) 5 MG tablet; Take 1 tablet (5 mg total) by mouth 3 (three) times a day as needed for muscle spasms.  Dispense: 30 tablet; Refill: 0    Lumbar radiculitis  -     Ambulatory referral to Physical Therapy  -     gabapentin (NEURONTIN) 300 MG capsule; Take 1 capsule (300 mg total) by mouth 3 (three) times a day.  Dispense: 90 capsule; Refill: 2  -     cyclobenzaprine (FLEXERIL) 5 MG tablet; Take 1 tablet (5 mg total) by mouth 3 (three) times a day as needed for muscle spasms.  Dispense: 30 tablet; Refill: 0    SI (sacroiliac) pain  -     Ambulatory referral to Physical Therapy  -     gabapentin (NEURONTIN) 300 MG capsule; Take 1 capsule (300 mg total) by mouth 3 (three) times a day.  Dispense: 90 capsule; Refill: 2  -     cyclobenzaprine (FLEXERIL) 5 MG tablet; Take 1 tablet (5 mg total) by mouth 3 (three) times a day as needed for muscle spasms.  Dispense: 30 tablet; Refill: 0    Myofascial pain  -     Ambulatory referral to Physical Therapy  -     gabapentin (NEURONTIN) 300 MG capsule; Take 1 capsule (300 mg total) by mouth 3 (three) times a day.  Dispense: 90 capsule; Refill: 2  -     cyclobenzaprine (FLEXERIL) 5 MG tablet; Take 1 tablet (5 mg total) by mouth 3 (three) times a day as needed for muscle spasms.  Dispense: 30 tablet; Refill: 0    Sleep disturbance  -     Ambulatory referral to Physical Therapy  -     gabapentin (NEURONTIN) 300 MG capsule; Take 1 capsule (300 mg total) by mouth 3 (three) times a day.  Dispense: 90 capsule; Refill: 2  -     cyclobenzaprine (FLEXERIL) 5 MG tablet; Take 1 tablet (5 mg total) by mouth 3 (three) times a day as needed for muscle spasms.  Dispense: 30 tablet; Refill: 0            Herbert ALICEA  Tonio is a 52 y.o. male  with a BMI of Body mass index is 33.19 kg/(m^2). who presents today in consultation at the request of Javid Feliciano MD  for new patient evaluation of chronic low back pain with the left side being worse than the right side.  Intermittent radicular paresthesias to the left lower extremity is concerning for possible disc herniation at the lower lumbar spine.  Patient left-sided low back pain is consistent with left SI joint dysfunction.  No red flags.          BING:  12  WHO-5:  19    PLAN:  A shared decision making model was used.  The patient's values and choices were respected.  The following represents what was discussed and decided upon by the physician and the patient.      1.  DIAGNOSTIC TESTS: We discussed obtaining a lumbar MRI to evaluate for possible disc herniation causing his left radicular paresthesias.  At this time , patient would like to start with physical therapy and medication.   2.  PHYSICAL THERAPY:  Discussed the importance of core strengthening, ROM, stretching exercises with the patient and how each of these entities is important in decreasing pain.  Explained to the patient that the purpose of physical therapy is to teach the patient a home exercise program.  These exercises need to be performed every day in order to decrease pain and prevent future occurrences of pain.  Likened it to brushing one's teeth.  I recommend patient to start with physical therapy program focusing on core strengthening and SI joints exercises.  3.  MEDICATIONS: Patient will start on gabapentin 300 mg, 1 tablet 3 times a day with gradual increase of 1 tablet every 3 days to help with the paresthesias sensation in the left lower extremity.  Flexeril 5 mg as needed for muscle spasm.  4.  INTERVENTIONS: We will consider obtaining imaging and consider left SI joint therapeutic steroid injection if not improved with physical therapy and medication..   5.  PATIENT EDUCATION: I thoroughly  discussed the plan with the patient today.  He verbalizes understanding and agrees with the plan.      FOLLOW-UP:   Patient will follow up with me in 7 weeks.  All questions were answered.      KAZ Garcia, MPA-C          SUBJECTIVE:  Herbert Elizalde  Is a 52 y.o. male who presents today for new patient evaluation of low back pain.  Patient reports chronic low back pain for years.  He reports low back pain with the left side being worse than the right side with intermittent numbness and tingling in the left gluteal left thigh.  At this time he reports 1 out of 10 intensity pain in the lower back while he sitting.  His symptoms are aggravated by standing, walking, bending over and rates it at 10 out of 10 intensity on its worse.  His symptoms are alleviated by taking ibuprofen 200 mg 2-3 tablets daily for pain, and rates it at 0 out of 10 intensity on its best.  Patient denies history of motor vehicle accidents, back surgeries.  He denies history of physical therapy to the lower back.  Patient denies urinary or bowel incontinence, unintentional weight loss, saddle anesthesia, fever or chills, balance difficulties.        Medications:    Current Outpatient Prescriptions   Medication Sig Dispense Refill     alpha lipoic acid 200 mg cap Take 400 mg by mouth.       amLODIPine-benazepril (LOTREL) 10-20 mg per capsule Take 1 capsule by mouth daily. 90 capsule 3     ascorbic acid, vitamin C, (VITAMIN C) 1000 MG tablet Take 1,000 mg by mouth daily.       carvedilol (COREG) 12.5 MG tablet TAKE 1 TABLET BY MOUTH TWICE DAILY WITH MEALS 180 tablet 1     cholecalciferol, vitamin D3, (VITAMIN D3) 5,000 unit Tab Take by mouth 2 (two) times a day.       coenzyme Q10 (CO Q-10) 200 mg capsule Take 200 mg by mouth 2 (two) times a day.       magnesium 250 mg Tab Take by mouth 2 (two) times a day.       melatonin 5 mg cap Take by mouth.       multivitamin therapeutic (THERAGRAN) tablet Take 1 tablet by mouth daily.       niacin  500 MG tablet Take 500 mg by mouth.       OMEGA-3/DHA/EPA/FISH OIL (FISH OIL-OMEGA-3 FATTY ACIDS) 300-1,000 mg capsule Take 1,000 mg by mouth daily.       omeprazole (PRILOSEC) 40 MG capsule Take 40 mg by mouth daily.       PHYTOSTEROL COMBINATION NO.1 (CHOLEST CARE ORAL) Take 800 mg by mouth.       UNABLE TO FIND Med Name: PHYTOMEGA 400 MG BID       cyclobenzaprine (FLEXERIL) 5 MG tablet Take 1 tablet (5 mg total) by mouth 3 (three) times a day as needed for muscle spasms. 30 tablet 0     gabapentin (NEURONTIN) 300 MG capsule Take 1 capsule (300 mg total) by mouth 3 (three) times a day. 90 capsule 2     No current facility-administered medications for this encounter.        Allergies:   Allergies   Allergen Reactions     Amoxicillin        PMH:    Past Medical History:   Diagnosis Date     Squamous cell carcinoma of head and neck      Throat cancer        PSH:    Past Surgical History:   Procedure Laterality Date     SQUAMOUS CELL CARCINOMA EXCISION Right     NECK  2012   [facility]     TONSILLECTOMY         Family History:    Family History   Problem Relation Age of Onset     Cancer Mother 74     OVARIAN     Diabetes Father      Heart disease Father      Anxiety disorder Brother      Hyperlipidemia Brother        Social History:   Social History     Social History     Marital status: Life Partner     Spouse name: N/A     Number of children: N/A     Years of education: N/A     Occupational History     Not on file.     Social History Main Topics     Smoking status: Former Smoker     Types: Cigarettes     Quit date: 8/6/2001     Smokeless tobacco: Never Used     Alcohol use No     Drug use: No     Sexual activity: Not Currently     Partners: Male     Birth control/ protection: None     Other Topics Concern     Not on file     Social History Narrative       ROS: Bilateral low back pain with the left side being worse than the right side.  Specifically negative for bowel/bladder dysfunction, fevers,chills, appetite changes,  unexplained weight loss.   Otherwise 13 systems reviewed are negative.  Please see the patient's intake questionnaire from today for details.      OBJECTIVE:  PHYSICAL EXAMINATION:    CONSTITUTIONAL:  Vital signs as above.  No acute distress.  The patient is well nourished and well groomed.  PSYCHIATRIC:  The patient is awake, alert, oriented to person, place, time and answering questions appropriately with clear speech.    SKIN:  Skin over the face, bilateral lower extremities, and posterior torso is clean, dry, intact without rashes.    GAIT:  Gait is non-antalgic.  The patient is able to heel and toe walk without significant difficulty.    STANDING EXAMINATION: Patient has tenderness at the left SI joint.  MUSCLE STRENGTH:  The patient has 5/5 strength for the bilateral hip flexors, knee flexors/extensors, ankle dorsiflexors/plantar flexors, great toe extensors, ankle evertors/invertors.  NEUROLOGICAL:  2/4 patellar, medial hamstring, and achilles reflexes bilaterally.  Negative Babinski's bilaterally.  No ankle clonus bilaterally. Sensation to light touch is intact in the bilateral L4, L5, and S1 dermatomes.  VASCULAR:  2/4  pulses bilaterally.  Bilateral lower extremities are warm.  There is no pitting edema of the bilateral lower extremities.  ABDOMINAL:  Soft, non-distended, non-tender throughout all quadrants.  No pulsatile mass palpated in the left lower quadrant.  LYMPH NODES:  No palpable or tender inguinal/popliteal lymph nodes.  MUSCULOSKELETAL: Positive Liban test on the left.  Negative straight leg raise bilaterally.  Negative hip impingement bilaterally.  Negative Liban test on the right.      RESULTS: No imaging to review today.

## 2021-06-15 NOTE — TELEPHONE ENCOUNTER
RN cannot approve Refill Request    RN can NOT refill this medication PCP messaged that patient is overdue for Office Visit and BP. Last office visit: 6/1/2018 Javid Sanchez MD Last Physical: 11/30/2018 Last MTM visit: Visit date not found Last visit same specialty: 6/1/2018 Javid Sanchez MD.  Next visit within 3 mo: Visit date not found  Next physical within 3 mo: Visit date not found      Alia Hurley, [facility] Triage/Med Refill 2/23/2021    Requested Prescriptions   Pending Prescriptions Disp Refills     carvediloL (COREG) 25 MG tablet [Pharmacy Med Name: CARVEDILOL TABS 25MG] 180 tablet 3     Sig: TAKE 1 TABLET TWICE A DAY WITH MEALS (OVERDUE FOR VISIT 6/1/2020) (OVERDUE FOR VISIT NEEDS APPOINTMENT PRIOR TO ADDITIONAL REFILLS)       Beta-Blockers Refill Protocol Failed - 2/23/2021  1:16 AM        Failed - PCP or prescribing provider visit in past 12 months or next 3 months     Last office visit with prescriber/PCP: 6/1/2018 Javid Sanchez MD OR same dept: Visit date not found OR same specialty: 6/1/2018 Javid Sanchez MD  Last physical: 11/30/2018 Last MTM visit: Visit date not found   Next visit within 3 mo: Visit date not found  Next physical within 3 mo: Visit date not found  Prescriber OR PCP: Javid Sanchez MD  Last diagnosis associated with med order: 1. HTN (hypertension)  - carvediloL (COREG) 25 MG tablet [Pharmacy Med Name: CARVEDILOL TABS 25MG]; TAKE 1 TABLET TWICE A DAY WITH MEALS (OVERDUE FOR VISIT 6/1/2020) (OVERDUE FOR VISIT NEEDS APPOINTMENT PRIOR TO ADDITIONAL REFILLS)  Dispense: 180 tablet; Refill: 3    If protocol passes may refill for 12 months if within 3 months of last provider visit (or a total of 15 months).             Failed - Blood pressure filed in past 12 months     BP Readings from Last 1 Encounters:   12/12/18 139/88

## 2021-06-16 NOTE — TELEPHONE ENCOUNTER
RN cannot approve Refill Request    RN can NOT refill this medication PCP messaged that patient is overdue for Office Visit. Last office visit: 6/1/2018 Javid Sanchez MD Last Physical: 11/30/2018 Last MTM visit: Visit date not found Last visit same specialty: 6/1/2018 Javid Sanchez MD.  Next visit within 3 mo: Visit date not found  Next physical within 3 mo: Visit date not found      Caroline Gomez, [facility] Triage/Med Refill 3/31/2021    Requested Prescriptions   Pending Prescriptions Disp Refills     carvediloL (COREG) 25 MG tablet [Pharmacy Med Name: CARVEDILOL TABS 25MG] 60 tablet 11     Sig: TAKE 1 TABLET TWICE A DAY WITH MEALS (NO ADDITIONAL REFILLS WITHOUT AN APPOINTMENT)       Beta-Blockers Refill Protocol Failed - 3/30/2021 10:24 PM        Failed - PCP or prescribing provider visit in past 12 months or next 3 months     Last office visit with prescriber/PCP: 6/1/2018 Javid Sanchez MD OR same dept: Visit date not found OR same specialty: 6/1/2018 Javid Sanchez MD  Last physical: 11/30/2018 Last MTM visit: Visit date not found   Next visit within 3 mo: Visit date not found  Next physical within 3 mo: Visit date not found  Prescriber OR PCP: Javid Sanchez MD  Last diagnosis associated with med order: 1. HTN (hypertension)  - carvediloL (COREG) 25 MG tablet [Pharmacy Med Name: CARVEDILOL TABS 25MG]; TAKE 1 TABLET TWICE A DAY WITH MEALS (NO ADDITIONAL REFILLS WITHOUT AN APPOINTMENT)  Dispense: 60 tablet; Refill: 11    If protocol passes may refill for 12 months if within 3 months of last provider visit (or a total of 15 months).             Failed - Blood pressure filed in past 12 months     BP Readings from Last 1 Encounters:   12/12/18 139/88

## 2021-06-16 NOTE — PATIENT INSTRUCTIONS - HE
Always good to see Harshad    Thank you for coming in    Likely the swelling had was related to the amlodipine    We could always increase the water pill portion of your blood pressure medication, which is the hydrochlorothiazide    Compression stockings can be helpful    Just as you alluded, the question is why it happened and what was the trigger    Stay active  Exercise is a medicine for hypertension/high blood pressure and for maintaining good cardiac vascular fitness    We will check you today for your normal screening blood work for physical    Your exam is really pretty darn normal    I suspect it was something in your diet that triggered some retention of salt and along with the amlodipine caused some leg swelling    I am going to screen you today for prostate cancer with a PSA    Your last colonoscopy was 2013 you are due in 2023    Get your Covid vaccine scheduled on Monday    [facility]    Adult Physical AVS      Thank you for scheduling and completing a Physical Check up!      There has been suggestions that this is an Unnecessary part of the current care for patients by some. I do not agree!    It is a reminder to take stock in an overall health plan.     It also hopefully will engage dialogue about wellness and focusing on measure to stay well and fit, hopefully avoiding extra trips to see us!      -----------Wellness Pillars-----------    1. Nutrient Dense Nutrition-- we are or will become and are activated by what we eat! It is paramount that we all focus on eating well. This means trying to eat ?hole Foods,  single ingredient foods that nourish and activate our bodies. Food that are high in nutrients help run our bodies in a way that bynum and can transform our health and help us to heal and repair. Major groups include. Fats----preferably plant based. These help ours brains and nervous system. Proteins---from plants and animals. Nuts, Beans and Animal proteins. These help our musculoskeletal system.  Complex Carbohydrates---fresh fruits vegetables, and whole grains. In order to maintain balance, we must give our bodies balanced nutrition. There are things we should avoid. Most processed foods and all added sugar should be avoided. If you do not prepare your own food, order simple foods a la carte. Order a protein and pair it with a side of vegetables. Vegetable should occupy more than half of your plate. Try to avoid simple carbohydrates like chips or fries.   2. Restorative Sleep----we all need sleep to allow our bodies to heal and repair. Sleep is indispensable and necessary. Good quality sleep is different from the simple quantitative amount of sleep. It is possible to sleep without getting any rest or restorative sleep. This is why we ask about refreshing sleep, because it is possible to sleep and not have restorative sleep. If your sleep is not refreshing, you may requires a visit with a Sleep Specialist to help sort this out. For quantity, aim for 8-10 hours daily.   3. Movement---exercise has numerous health benefits. Bottom line, in order to do the things we do in life, it is necessary to condition, nurture and repair our machine. Food provides the tools and the basic repair structure and vital nutrients. Rest allows time and focus for repair and restoration. Exercise conditions and activated reparative mechanisms. I would suggest thinking about one's high school weight as a rule and aim for a weight plus 15 pounds for men and 10 pounds for women as a goal. Also we should strive to engage in intentional activity 3 to 4 days of cardio fitness 30-60 minutes and 1-3 days of strength training. We want to be fit as we age and have stamina to do activities of daily living and beyond. Walk, bike, swim, run, box, dance, and so on, find your thing! The benefits are incredible! Exercise lowers blood pressure, helps treat and prevent diabetes and helps us live longer and in a way that is more satisfying. As Glo  says,  Just Do It!  4. Mindfulness----get in touch with your mind body connection. Take time daily to reflect on and be cognizant of how you are doing----eating, working, parenting, interacting with loved ones, friends and others. Be intentional and present in relating to things in which you are engaged.     Preventative Care    Colon Cancer screening. It may not be glamorous, but it saves lives and may save yours. There is colonoscopy and immuno-chemical testing. Pick your mode , but get it done. If there is a first degree relative that has had Colon Cancer, we may recommend screening 10 years before the age of that index case.     Breast Cancer Screening. This is mostly for women. Get to know the architecture of your breasts. If it makes you feel more comfortable, engage your partner as well. If something strikes you as ?ot right,  get it checked out. Mammogram breast cancer screening does detect cancers. Self breast exams can detect cancers. Guidelines vary but in general start screen in adulthood for self exams and mammography in your 40s. If there is a first degree relative or many with cancers, this may be earlier or involve genetic screening.     Pap Smears Cervical Cancer Screening---again women. Start screening after sexual activity or intercourse begins. Cervical cancer really is tied to exposure to HPV virus and this is related to sexual intercourse. Cervical cancer is treatable and preventable, as Pap smears should detect changes BEFORE cancerous transformation.   All women that have a cervix should be screen between 21-65 as a rule. Intervals vary based on age and medical history.     Vaccines. Yes there is controversy. But I still think vaccines save lives and reduce disease burden in our human populations. Please consider getting vaccinated as you are due for Vaccines.     About Vitamins and Supplements     I do recommend that adult and kids consider a multivitamin as way to enrich our nutrition.      A solid multivitamin. Ask one of us for recommendations. Our pharmacy carry some high quality lower cost vitamins that we have recommends.     [place] 3/6 Oils Fish Oils, Flax Oils, Krill Oils, Algea Oils, and Cumin Seed and Borage Oils are among the Omega 3s and 6s. Good oils nourish our nervous system and engage our immune system in positive ways.     Vitamin D. If you are in [place]. You probably need some. We shoot for a level of 50-60. So sometime this takes staring lower and titrating up a supplement. Start at 5126-1128 IU wit a fatty meal and check levels.             Certain supplements may help with our gut's immune system or Microbiome.   Start with plants, many and of diverse colors.   Consider cultured foods with live active bacteria. Examples are Yogurt, Kefir and Kombucha.     Eat Prebiotic foods from the Chicori family, asparagus, bananas, inulin fibers and more.     Other supplements that may help are Zinc Carnosine, D-limonene, Vitamin D and Colostrum.     It may prudent to try the Elimination Diet and eliminate certain foods such as Wheat products, Dairy Products and Especially Refined Sugars.       We are screening multiple issues:     High Blood Pressure.   We ideally have readings less than 130 on the top number and less than 80 on the bottom.     Diabetes.   Diabetes is the body not processing sugars in an efficient way. When sugars are not dealt with in an efficient manner, every part of the body can be effected, the heart such as a heart attack or failure, the brain such as a stroke and really any part of the body. Insulin levels being overworked really drives diabetes. Lowering intake of simple sugar and exercising are two major ways to treat and stave off Diabetes.     Heart Disease:  Heart issues usually arise out of a combination of genetic issues and life stressors and choices.   Focusing on the Pillars of Wellness are ways to help prevent heart disease. Avoiding tobacco, limiting  alcohol intake to moderate intake and addressing out  of balance cholesterol, presence of diabetes and persistently elevated blood pressure may require medications in addition to life style changes.     Skin cancer is typically due to cumulative Sun Damage. There are other genetic factor as well. If skin looks irritated or a mole changes color, shape, size or has irregular borders, get it checked out. Skin exams can also save lives.     If you are Diabetic or Have Known Heart Disease. We have goals for your best Care     A1C. For Diabetics     This is a measure of how well controlled your sugars are over the last 3 months. In general, we would like the percent number less than 7% for most diabetics. In the morning and before all meals the sugar number should be less than 150. If you are diabetic and this is the case, you are managing well. The Pillars of Wellness are still a guide to keeping your body in balance.     Blood Pressure for Diabetics and Heart Disease     Top < 130 Bottom < 80  This goal of blood pressure control reduces Diabetes complications, such as stroke or heart attack. Life style first and add medication if consistently high.     No Tobacco. For Anyone!    Smoking tobacco or chewing produces by products that are particularly harmful to Diabetics, but really all of us. I implore you to not use tobacco products.     Aspirin for Heart Disease Patient     If you have high blood pressure, stroke or heart disease risk, you probably would benefit from a baby aspirin. There reasons to avoid aspirin such as allergies, risks for bleeding, etc. have the discussion of aspirin should be part of your therapy.     Statin Medications for Heart Disease, Vascular Disease or High Risk Patients    These medicine have statistically been shown to benefit Diabetic patients, especially those with disordered cholesterol profiles. I like to do an NMR panel that shows Atherogenic Risk (Stroke/ Heart Attack) and Insulin  Resistance. Likely this may be recommended as part of you treatment plan.

## 2021-06-16 NOTE — PROGRESS NOTES
ASSESSMENT & PLAN    Right ankle swelling  3 days   Stopped fish oil  Stopped Mag     Elevate feet       Herbert was seen today for edema.    Diagnoses and all orders for this visit:    Right ankle swelling    Morbid obesity (H)    Localized edema  -     [facility] PRO BNP Outpatient (NTBNP)    Essential hypertension  -     Lipid Cascade RANDOM  -     Comprehensive Metabolic Panel  -     HM2(CBC w/o Differential)  -     Urinalysis-UC if Indicated  -     Thyroid Stimulating Hormone (TSH)    Vitamin D deficiency  -     Vitamin D, Total (25-Hydroxy)    Screening for prostate cancer  -     PSA (Prostatic-Specific Antigen), Annual Screen    Screening for diabetes mellitus  -     Glycosylated Hemoglobin A1c    Screening for HIV (human immunodeficiency virus)  -     HIV Antigen/Antibody Screening [place]     screening for hepatitis B virus declined    Encounter for hepatitis C screening test for low risk patient  -     Hepatitis C Antibody (Anti-HCV)        Patient Instructions   Always good to see Harshad    Thank you for coming in    Likely the swelling had was related to the amlodipine    We could always increase the water pill portion of your blood pressure medication, which is the hydrochlorothiazide    Compression stockings can be helpful    Just as you alluded, the question is why it happened and what was the trigger    Stay active  Exercise is a medicine for hypertension/high blood pressure and for maintaining good cardiac vascular fitness    We will check you today for your normal screening blood work for physical    Your exam is really pretty darn normal    I suspect it was something in your diet that triggered some retention of salt and along with the amlodipine caused some leg swelling          Return in about 2 weeks (around 2021).       Little interest or pleasure in doing things: Not at all  Feeling down, depressed, or hopeless: Not at all    CHIEF COMPLAINT: Herbert Elizalde had concerns  "including Edema (bailateral ankle swelling).    [place]: 1.............. SUBJECTIVE:  Herbert Elizalde is a 56 y.o. male had concerns including Edema (bailateral ankle swelling).    1. Right ankle swelling    2. Morbid obesity (H)    3. Localized edema    4. Essential hypertension    5. Vitamin D deficiency    6. Screening for prostate cancer    7. Screening for diabetes mellitus    8. Screening for HIV (human immunodeficiency virus)    9.  screening for hepatitis B virus declined    10. Encounter for hepatitis C screening test for low risk patient          Allergies   Allergen Reactions     Amoxicillin                          SOCIAL: He  reports that he quit smoking about 19 years ago. His smoking use included cigarettes. He has never used smokeless tobacco. He reports that he does not drink alcohol or use drugs.    REVIEW OF SYSTEMS:   Family history not pertinent to chief complaint or presenting problem    Review of systems otherwise negative as requested from patient, except   Those positive ROS outlined and discussed in [place].      VITALS:  Vitals:    21 1534   BP: 138/88   Patient Site: Left Arm   Patient Position: Sitting   Cuff Size: Adult Large   Pulse: 83   SpO2: 96%   Weight: (!) 268 lb 4 oz (121.7 kg)   Height: 6' 0.5\" (1.842 m)     Wt Readings from Last 3 Encounters:   21 (!) 268 lb 4 oz (121.7 kg)   18 (!) 256 lb 4 oz (116.2 kg)   18 (!) 258 lb (117 kg)     Body mass index is 35.88 kg/m .    Physical Exam:  Full range of affect    No jugular venous distention  No carotid bruits  Lungs are clear  Cardiac no murmur  Back he has 2 darker compound looking nevi  1 brown slightly irregular pigmented nevi in the right back    Trace edema sock line lower extremities bilaterally  Excellent distal pulses  Bunion deformities  Good capillary refill  Hammertoes in the left  Calluses at the second metatarsal           I spent 30  minutes with this patient.  This includes pre-visit, " intra-visit and post visit work an evaluation with regards to Herbert was seen today for edema.    Diagnoses and all orders for this visit:    Right ankle swelling    Morbid obesity (H)    Localized edema  -     [facility] BNP Outpatient (NTBNP)    Essential hypertension  -     Lipid Cascade RANDOM  -     Comprehensive Metabolic Panel  -     HM2(CBC w/o Differential)  -     Urinalysis-UC if Indicated  -     Thyroid Stimulating Hormone (TSH)    Vitamin D deficiency  -     Vitamin D, Total (25-Hydroxy)    Screening for prostate cancer  -     PSA (Prostatic-Specific Antigen), Annual Screen    Screening for diabetes mellitus  -     Glycosylated Hemoglobin A1c    Screening for HIV (human immunodeficiency virus)  -     HIV Antigen/Antibody Screening [place]     screening for hepatitis B virus declined    Encounter for hepatitis C screening test for low risk patient  -     Hepatitis C Antibody (Anti-HCV)        Javid Sanchez MD  Family Medicine   [facility] [address] [phone]

## 2021-06-16 NOTE — TELEPHONE ENCOUNTER
RN cannot approve Refill Request    RN can NOT refill this medication PCP messaged that patient is overdue for Labs and Office Visit. Last office visit: 6/1/2018 Javid Sanchez MD Last Physical: 11/30/2018 Last MTM visit: Visit date not found Last visit same specialty: 6/1/2018 Javid Sanchez MD.  Next visit within 3 mo: 4/9/2021 Javid Sanchez MD  Next physical within 3 mo: Visit date not found      Alia Hurley, [facility] Triage/Med Refill 4/10/2021    Requested Prescriptions   Pending Prescriptions Disp Refills     carvediloL (COREG) 25 MG tablet [Pharmacy Med Name: CARVEDILOL TABS 25MG] 35 tablet 19     Sig: TAKE 1 TABLET TWICE A DAY WITH MEALS (NO ADDITIONAL REFILLS WITHOUT AN APPOINTMENT)       Beta-Blockers Refill Protocol Failed - 4/9/2021  8:11 AM        Failed - PCP or prescribing provider visit in past 12 months or next 3 months     Last office visit with prescriber/PCP: 6/1/2018 Javid Sanchez MD OR same dept: Visit date not found OR same specialty: 6/1/2018 Javid Sanchez MD  Last physical: 11/30/2018 Last MTM visit: Visit date not found   Next visit within 3 mo: 4/9/2021 Javid Sanchez MD  Next physical within 3 mo: Visit date not found  Prescriber OR PCP: Javid Sanchez MD  Last diagnosis associated with med order: 1. HTN (hypertension)  - carvediloL (COREG) 25 MG tablet [Pharmacy Med Name: CARVEDILOL TABS 25MG]; TAKE 1 TABLET TWICE A DAY WITH MEALS (NO ADDITIONAL REFILLS WITHOUT AN APPOINTMENT)  Dispense: 35 tablet; Refill: 19    If protocol passes may refill for 12 months if within 3 months of last provider visit (or a total of 15 months).             Failed - Blood pressure filed in past 12 months     BP Readings from Last 1 Encounters:   04/09/21 138/88                   
n/a

## 2021-06-18 NOTE — PROGRESS NOTES
ASSESSMENT & PLAN    No problem-specific Assessment & Plan notes found for this encounter.      Herbert was seen today for hypertension, numbness and skin discoloration.    Diagnoses and all orders for this visit:    Essential hypertension  -     Comprehensive Metabolic Panel; Future    Venous stasis    Medial epicondylitis    Other orders  -     amLODIPine (NORVASC) 10 MG tablet; Take 1 tablet (10 mg total) by mouth daily.  -     carvedilol (COREG) 25 MG tablet; Take 1 tablet (25 mg total) by mouth 2 (two) times a day with meals.  -     valsartan-hydrochlorothiazide (DIOVAN-HCT) 160-25 mg per tablet; Take 1 tablet by mouth daily.        No Follow-up on file.           CHIEF COMPLAINT: Herbert Elizalde had concerns including Hypertension; Numbness; and Skin Discoloration.    [place]: 1.............. had concerns including Hypertension; Numbness; and Skin Discoloration.    1. Essential hypertension    2. Venous stasis    3. Medial epicondylitis          CC:              Discoloraton    What's it like in one word?:                                             Looks purplish  How long is it ongoing: days, weeks,months?:                Noted this last month  What makes it worse: activity? Eating? Movement? :     Nothing on feet  What makes it better?:                                                     nothing  0/10-10/10:Pain/Intesity                                                  Just there    Any associated Sx to above complaint no pain, looks off color    Any other Problems in order of Priority:        SUBJECTIVE:  Herbert Elizalde is a 53 y.o. male    Past Medical History:   Diagnosis Date     Squamous cell carcinoma of head and neck      Throat cancer      Past Surgical History:   Procedure Laterality Date     SQUAMOUS CELL CARCINOMA EXCISION Right     NECK  2012   [place]     TONSILLECTOMY       Amoxicillin  Current Outpatient Prescriptions   Medication Sig Dispense Refill     alpha lipoic acid 200 mg cap Take 400 mg by  mouth.       ascorbic acid, vitamin C, (VITAMIN C) 1000 MG tablet Take 1,000 mg by mouth daily.       cholecalciferol, vitamin D3, (VITAMIN D3) 5,000 unit Tab Take by mouth 2 (two) times a day.       coenzyme Q10 (CO Q-10) 200 mg capsule Take 200 mg by mouth 2 (two) times a day.       cyclobenzaprine (FLEXERIL) 5 MG tablet Take 5 mg by mouth 3 (three) times a day as needed for muscle spasms.       gabapentin (NEURONTIN) 300 MG capsule Take 1 capsule (300 mg total) by mouth 3 (three) times a day for 15 days. NEEDS APPT FOR MORE REFILLS 45 capsule 0     magnesium 250 mg Tab Take by mouth 2 (two) times a day.       melatonin 5 mg cap Take by mouth.       multivitamin therapeutic (THERAGRAN) tablet Take 1 tablet by mouth daily.       niacin 500 MG tablet Take 500 mg by mouth.       OMEGA-3/DHA/EPA/FISH OIL (FISH OIL-OMEGA-3 FATTY ACIDS) 300-1,000 mg capsule Take 1,000 mg by mouth daily.       omeprazole (PRILOSEC) 40 MG capsule Take 40 mg by mouth daily.       PHYTOSTEROL COMBINATION NO.1 (CHOLEST CARE ORAL) Take 800 mg by mouth.       UNABLE TO FIND Med Name: PHYTOMEGA 400 MG BID       amLODIPine (NORVASC) 10 MG tablet Take 1 tablet (10 mg total) by mouth daily. 90 tablet 3     carvedilol (COREG) 25 MG tablet Take 1 tablet (25 mg total) by mouth 2 (two) times a day with meals. 180 tablet 3     valsartan-hydrochlorothiazide (DIOVAN-HCT) 160-25 mg per tablet Take 1 tablet by mouth daily. 30 tablet 11     No current facility-administered medications for this visit.      Family History   Problem Relation Age of Onset     Cancer Mother 74     OVARIAN     Diabetes Father      Heart disease Father      Anxiety disorder Brother      Hyperlipidemia Brother      Social History     Social History     Marital status: Life Partner     Spouse name: N/A     Number of children: N/A     Years of education: N/A     Social History Main Topics     Smoking status: Former Smoker     Types: Cigarettes     Quit date: 8/6/2001     Smokeless  "tobacco: Never Used     Alcohol use No     Drug use: No     Sexual activity: Not Currently     Partners: Male     Birth control/ protection: None     Other Topics Concern     None     Social History Narrative     Patient Active Problem List   Diagnosis     HTN (hypertension)     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 His smoking use included Cigarettes. He has never used smokeless tobacco. He reports that he does not drink alcohol or use illicit drugs.    REVIEW OF SYSTEMS:   Family history not pertinent to chief complaint or presenting problem    Review of systems otherwise negative as requested from patient, except   Those positive ROS outlined and discussed in [place].    OBJECTIVE:  BP (!) 126/92  Pulse 88  Ht 6' 0.5\" (1.842 m)  Wt (!) 258 lb (117 kg)  SpO2 98%  BMI 34.51 kg/m2    GENERAL:     No acute distress.   Alert and oriented X 3         Physical:    Venous stasis changes legs  ++ DP PT pulses  No edema  + tender palpation epicondyle medial reproduces pain  BP not at goal      ASSESSMENT & PLAN      Herbert was seen today for hypertension, numbness and skin discoloration.    Diagnoses and all orders for this visit:    Essential hypertension  -     Comprehensive Metabolic Panel; Future    Venous stasis    Medial epicondylitis    Other orders  -     amLODIPine (NORVASC) 10 MG tablet; Take 1 tablet (10 mg total) by mouth daily.  -     carvedilol (COREG) 25 MG tablet; Take 1 tablet (25 mg total) by mouth 2 (two) times a day with meals.  -     valsartan-hydrochlorothiazide (DIOVAN-HCT) 160-25 mg per tablet; Take 1 tablet by mouth daily.        No Follow-up on file.       Anticipatory Guidance and Symptomatic Cares Discussed   Advised to call back directly if there are further questions, or if these symptoms fail to improve as anticipated or worsen.  Return to clinic if patient has a clinical " concern that warrants an exam.        25 Min Total Time, > 50% counseling and coordination of Care    Javid Sanchez MD  Family Medicine   [facility] [address] [phone]

## 2021-06-22 NOTE — PROGRESS NOTES
ASSESSMENT & PLAN    No problem-specific Assessment & Plan notes found for this encounter.      Herbert was seen today for annual exam, wrist pain and arm pain.    Diagnoses and all orders for this visit:    Well adult exam  -     Lipoprotein a (LPPA)  -     Dehydroepiandrosterone Sulfate, Serum (DHEAS)  -     LipoFit by NMR  -     Comprehensive Metabolic Panel  -     HM1(CBC and Differential)  -     C -Reactive Protein, High Sensitivity  -     Vitamin D, Total (25-Hydroxy)  -     Urinalysis-UC if Indicated  -     PSA (Prostatic-Specific Antigen), Annual Screen  -     Thyroid Cascade  -     Glycosylated Hemoglobin A1c  -     Homocysteine  -     Hepatitis C Antibody (Anti-HCV)  -     HM1 (CBC with Diff)    Screening for prostate cancer  -     Vitamin B12    Essential hypertension  -     Comprehensive Metabolic Panel  -     C -Reactive Protein, High Sensitivity  -     Urinalysis-UC if Indicated  -     Thyroid Cascade  -     Homocysteine  -     Ambulatory referral to Sleep Medicine    Exposure to potential infection  -     Hepatitis C Antibody (Anti-HCV)    Sleep disorder  -     Ambulatory referral to Sleep Medicine    Other orders  -     Influenza, Seasonal Quad, Preservative Free 36+ Months  -     valsartan-hydrochlorothiazide (DIOVAN HCT) 320-25 mg per tablet; Take 1 tablet by mouth daily.        Patient Instructions   Increase valsartan to 320/25    Accomplishes sleep study    Work on breathing exercises four square breathing    Please do several blood pressures and fax them here to clinic            Return in about 1 month (around 12/30/2018) for follow through on Colon Cancer screening.            CHIEF COMPLAINT: Herbert Elizalde had concerns including Annual Exam (physical exam, talk about little ache in back and dry mouth issue related to BP medications ); Wrist Pain (pt states on and off pain in right wrist, too much pressure ); and Arm Pain (pt states ongoing numbness and tingling in left arm ).    [place]:  1.............. had concerns including Annual Exam (physical exam, talk about little ache in back and dry mouth issue related to BP medications ); Wrist Pain (pt states on and off pain in right wrist, too much pressure ); and Arm Pain (pt states ongoing numbness and tingling in left arm ).    1. Well adult exam    2. Screening for prostate cancer    3. Essential hypertension    4. Exposure to potential infection    5. Sleep disorder          CC:             Why are you here today?                              Physical examination    Backache ongoing intermittent does not wish to think about it no progressive symptoms bowel or bladder incontinence saddle anesthesia  Dry mouth probably related to the water pill    Right wrist pain intermittent across the extensor tendons of the thumb no more medial and proximal to the wrist small area of tenderness really unchanged worse with activities    Left ARM medial numbness T1 area unchanged does not wish to have this worked up    Lots of stress  With his partner Yefri as well as his younger son Shin Patricia is here for an annual exam he has some pain on and off in his right wrist  Worse with pressure  He also has some intermittent numb numbness and tingling in his left arm  Not produced by any specific movement  Mild      Follow-up with your nose and throat clear of cancer      Any other Problems in order of Priority:        SUBJECTIVE:  Herbert Elizalde is a 53 y.o. 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       amLODIPine (NORVASC) 10 MG tablet Take 1 tablet (10 mg total) by mouth daily. 90 tablet 3     ascorbic acid, vitamin  C, (VITAMIN C) 1000 MG tablet Take 1,000 mg by mouth daily.       carvedilol (COREG) 25 MG tablet Take 1 tablet (25 mg total) by mouth 2 (two) times a day with meals. 180 tablet 3     cholecalciferol, vitamin D3, (VITAMIN D3) 5,000 unit Tab Take by mouth 2 (two) times a day.       coenzyme Q10 (CO Q-10) 200 mg capsule Take 200 mg by mouth 2 (two) times a day.       cyclobenzaprine (FLEXERIL) 5 MG tablet Take 5 mg by mouth 3 (three) times a day as needed for muscle spasms.       magnesium 250 mg Tab Take by mouth 2 (two) times a day.       melatonin 5 mg cap Take by mouth.       multivitamin therapeutic (THERAGRAN) tablet Take 1 tablet by mouth daily.       niacin 500 MG tablet Take 500 mg by mouth.       OMEGA-3/DHA/EPA/FISH OIL (FISH OIL-OMEGA-3 FATTY ACIDS) 300-1,000 mg capsule Take 1,000 mg by mouth daily.       omeprazole (PRILOSEC) 40 MG capsule Take 40 mg by mouth daily.       PHYTOSTEROL COMBINATION NO.1 (CHOLEST CARE ORAL) Take 800 mg by mouth.       UNABLE TO FIND Med Name: PHYTOMEGA 400 MG BID       valsartan-hydrochlorothiazide (DIOVAN HCT) 320-25 mg per tablet Take 1 tablet by mouth daily. 90 tablet 3     No current facility-administered medications for this visit.      Family History   Problem Relation Age of Onset     Cancer Mother 74        OVARIAN     Diabetes Father      Heart disease Father      Anxiety disorder Brother      Hyperlipidemia Brother      Social History     Socioeconomic History     Marital status: Life Partner     Spouse name: None     Number of children: None     Years of education: None     Highest education level: None   Social Needs     Financial resource strain: None     Food insecurity - worry: None     Food insecurity - inability: None     Transportation needs - medical: None     Transportation needs - non-medical: None   Occupational History     None   Tobacco Use     Smoking status: Former Smoker     Types: Cigarettes     Last attempt to quit: 8/6/2001     Years since  quittin.3     Smokeless tobacco: Never Used   Substance and Sexual Activity     Alcohol use: No     Drug use: No     Sexual activity: Not Currently     Partners: Male     Birth control/protection: None   Other Topics Concern     None   Social History Narrative     None     Patient Active Problem List   Diagnosis     HTN (hypertension)     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 His smoking use included cigarettes. he has never used smokeless tobacco. He reports that he does not drink alcohol or use drugs.    REVIEW OF SYSTEMS:   Family history not pertinent to chief complaint or presenting problem    Review of Systems:      Nervous System:  No headache, paresthesia or dizziness or fainting                                  Ears: No hearing loss or ringing in the ears    Eyes: No blurring of vision, Double Vision            No redness, itching or dryness.    Nose: No nosebleed or loss of smell    Mouth: No mouth sores or  coated tongue    Throat: No hoarseness or difficulty swallowing    Neck: No enlarged thyroid or lymph nodes.    Heart: No chest pain, palpitation or irregular heartbeat.                  Lungs: No shortness of breath, wheezing or hemoptysis.    Gastrointestinal: No nausea or vomiting, melena or blood in stools.    Kidney/Bladdr: No polyuria, polydipsia, or hematuria.                             Genital/Sexual: No Sex function Changes                                Skin: No rash    Muscles/Joints/Bones: No Muscle morning stiffness, No Effusion of a Joint     Review of systems otherwise negative as requested from patient, except   Those positive ROS outlined and discussed in [place].    OBJECTIVE:  /88   Pulse 90   Resp 16   Wt (!) 256 lb 4 oz (116.2 kg)   SpO2 97%   BMI 34.28 kg/m       GENERAL:     No acute distress.   Alert and oriented X 3         Physical:    Sclera  clear  TMs are clear  Oropharynx is clear  His mucosa is clear  Cor pulses full without bruits  Lungs are clear with equal symmetric air entry next  Thyroid pump nontender without nodules  Lungs are clear  Cardiac S1-S2 regular sinus do not appreciate a murmur gallop  No abdominal bruits  Abdomen flat soft flat nontender with positive bowel sounds  Excellent distal pulses  No lower extremity edema        ASSESSMENT & PLAN      Herbert was seen today for annual exam, wrist pain and arm pain.    Diagnoses and all orders for this visit:    Well adult exam  -     Lipoprotein a (LPPA)  -     Dehydroepiandrosterone Sulfate, Serum (DHEAS)  -     LipoFit by NMR  -     Comprehensive Metabolic Panel  -     HM1(CBC and Differential)  -     C -Reactive Protein, High Sensitivity  -     Vitamin D, Total (25-Hydroxy)  -     Urinalysis-UC if Indicated  -     PSA (Prostatic-Specific Antigen), Annual Screen  -     Thyroid Cascade  -     Glycosylated Hemoglobin A1c  -     Homocysteine  -     Hepatitis C Antibody (Anti-HCV)  -     HM1 (CBC with Diff)    Screening for prostate cancer  -     Vitamin B12    Essential hypertension  -     Comprehensive Metabolic Panel  -     C -Reactive Protein, High Sensitivity  -     Urinalysis-UC if Indicated  -     Thyroid Cascade  -     Homocysteine  -     Ambulatory referral to Sleep Medicine    Exposure to potential infection  -     Hepatitis C Antibody (Anti-HCV)    Sleep disorder  -     Ambulatory referral to Sleep Medicine    Other orders  -     Influenza, Seasonal Quad, Preservative Free 36+ Months  -     valsartan-hydrochlorothiazide (DIOVAN HCT) 320-25 mg per tablet; Take 1 tablet by mouth daily.        Return in about 1 month (around 12/30/2018) for follow through on Colon Cancer screening.     Patient was counseled on risk factors for heart disease today's labs  Lipoprotein a  DHEAS  Lipophilic  Homocystine  CRP  Discussed coronary screening if she does develop discomfort in his  chest    Exposure to infectious disease check for hepatitis C  Annual prostate cancer screening PSA  Hypertension CMP, CBC, TSH and urinalysis    Continue to stay active  Practice meditation  Increase blood pressure medications as outlined  Goal blood pressure medications discussed less than 140 systolic less than 85 diastolic flu shot given today  We talked about the importance of sleep he will have a sleep test to see if this is contributing to high blood pressure      Anticipatory Guidance and Symptomatic Cares Discussed   Advised to call back directly if there are further questions, or if these symptoms fail to improve as anticipated or worsen.  Return to clinic if patient has a clinical concern that warrants an exam.         I spent 30 patient minutes with this patient face to face, of which 50% or greater was spent in counseling and coordination of care with regards to Herbert was seen today for annual exam, wrist pain and arm pain.    Diagnoses and all orders for this visit:    Well adult exam  -     Lipoprotein a (LPPA)  -     Dehydroepiandrosterone Sulfate, Serum (DHEAS)  -     LipoFit by NMR  -     Comprehensive Metabolic Panel  -     HM1(CBC and Differential)  -     C -Reactive Protein, High Sensitivity  -     Vitamin D, Total (25-Hydroxy)  -     Urinalysis-UC if Indicated  -     PSA (Prostatic-Specific Antigen), Annual Screen  -     Thyroid Cascade  -     Glycosylated Hemoglobin A1c  -     Homocysteine  -     Hepatitis C Antibody (Anti-HCV)  -     HM1 (CBC with Diff)    Screening for prostate cancer  -     Vitamin B12    Essential hypertension  -     Comprehensive Metabolic Panel  -     C -Reactive Protein, High Sensitivity  -     Urinalysis-UC if Indicated  -     Thyroid Cascade  -     Homocysteine  -     Ambulatory referral to Sleep Medicine    Exposure to potential infection  -     Hepatitis C Antibody (Anti-HCV)    Sleep disorder  -     Ambulatory referral to Sleep Medicine    Other orders  -      Influenza, Seasonal Quad, Preservative Free 36+ Months  -     valsartan-hydrochlorothiazide (DIOVAN HCT) 320-25 mg per tablet; Take 1 tablet by mouth daily.        Javid Sanchez MD  Family Medicine   [facility] [address] [phone]

## 2021-08-07 ENCOUNTER — HEALTH MAINTENANCE LETTER (OUTPATIENT)
Age: 56
End: 2021-08-07

## 2021-10-02 ENCOUNTER — HEALTH MAINTENANCE LETTER (OUTPATIENT)
Age: 56
End: 2021-10-02

## 2022-06-05 ENCOUNTER — MYC MEDICAL ADVICE (OUTPATIENT)
Dept: FAMILY MEDICINE | Facility: CLINIC | Age: 57
End: 2022-06-05
Payer: OTHER GOVERNMENT

## 2022-06-05 DIAGNOSIS — I10 ESSENTIAL HYPERTENSION: ICD-10-CM

## 2022-06-06 RX ORDER — CARVEDILOL 25 MG/1
TABLET ORAL
Qty: 180 TABLET | Refills: 0 | Status: SHIPPED | OUTPATIENT
Start: 2022-06-06 | End: 2022-08-17

## 2022-06-06 NOTE — TELEPHONE ENCOUNTER
Routing refill request to provider for review/approval because:  Kita given x1 and patient did not follow up, please advise      Last Written Prescription Date:  02/15/22  Last Fill Quantity: 180,  # refills: 0    Last office visit: 04/09/2021   Future Office Visit:  None scheduled      Outpatient Medication Detail     Disp Refills Start End SANTI   carvedilol (COREG) 25 MG tablet 180 tablet 0 2/15/2022  No   Sig: [CARVEDILOL (COREG) 25 MG TABLET] TAKE 1 TABLET TWICE A DAY WITH MEALS   Sent to pharmacy as: Carvedilol 25 MG Oral Tablet (COREG)   Class: E-Prescribe   Notes to Pharmacy: (NO ADDITIONAL REFILLS WITHOUT AN APPOINTMENT)   Order: [id number]   E-Prescribing Status: Receipt confirmed by pharmacy (2/15/2022  1:20 PM CST)

## 2022-08-17 DIAGNOSIS — I10 ESSENTIAL HYPERTENSION: ICD-10-CM

## 2022-08-18 RX ORDER — CARVEDILOL 25 MG/1
TABLET ORAL
Qty: 180 TABLET | Refills: 0 | Status: SHIPPED | OUTPATIENT
Start: 2022-08-18 | End: 2022-08-26

## 2022-08-23 ENCOUNTER — MYC REFILL (OUTPATIENT)
Dept: FAMILY MEDICINE | Facility: CLINIC | Age: 57
End: 2022-08-23

## 2022-08-23 DIAGNOSIS — I10 ESSENTIAL HYPERTENSION: ICD-10-CM

## 2022-08-24 RX ORDER — CARVEDILOL 25 MG/1
TABLET ORAL
Qty: 180 TABLET | Refills: 0 | OUTPATIENT
Start: 2022-08-24

## 2022-08-24 NOTE — TELEPHONE ENCOUNTER
Pending Prescriptions:                       Disp   Refills    carvedilol (COREG) 25 MG tablet           180 ta*0            Sig: TAKE 1 TABLET TWICE A DAY WITH MEALS (NEED           APPOINTMENT)      Rx sent 8/18/22 - Pt stating refill was denied   Please resend

## 2022-08-25 ENCOUNTER — TELEPHONE (OUTPATIENT)
Dept: INTERNAL MEDICINE | Facility: CLINIC | Age: 57
End: 2022-08-25

## 2022-08-25 DIAGNOSIS — I10 ESSENTIAL HYPERTENSION: ICD-10-CM

## 2022-08-25 NOTE — TELEPHONE ENCOUNTER
I attempted to reach patient via his preferred number and spoke with his spouse who advised that patient was not available and requested writer attempt patient's cell phone.  A message was left on patient's cell phone requesting a call back to the clinic to discuss the concern regarding refill request for carvedilol with a nurse.    Harika Yang M.A., LPN  Clinic Manager  [facility] [place]

## 2022-08-25 NOTE — TELEPHONE ENCOUNTER
"revived a fax from [employer] for a clarification on if Dr Elizalde was related to him or not- said \"per [place] regulations,a doctor is not allowed to prescribe for self or family members. Please clarify if the dr and patient are related and if so, please have a different doctor write for the medication.thnak you\"  I called back to let them know that they are not, per Jyothi QUARLES, someone had called a spoke with Guillermina here on the 23rd and was told it was denied.  Checked with Guillermina (TC) she said it was denied on the 23 rd due to the OK on the 18 th and that Herbert needs to make an appointment with a new provider as his old PCP is no longer with us.      Can you please OK this for him so he can get his refill as he looks for a new PCP    Thanks    Ana Dominique CMA ([facility])    "

## 2022-08-26 RX ORDER — CARVEDILOL 25 MG/1
TABLET ORAL
Qty: 180 TABLET | Refills: 0 | Status: SHIPPED | OUTPATIENT
Start: 2022-08-26 | End: 2022-11-25

## 2022-09-03 ENCOUNTER — HEALTH MAINTENANCE LETTER (OUTPATIENT)
Age: 57
End: 2022-09-03

## 2022-09-09 ENCOUNTER — LAB REQUISITION (OUTPATIENT)
Dept: LAB | Facility: CLINIC | Age: 57
End: 2022-09-09

## 2022-09-09 DIAGNOSIS — Z12.5 ENCOUNTER FOR SCREENING FOR MALIGNANT NEOPLASM OF PROSTATE: ICD-10-CM

## 2022-09-09 DIAGNOSIS — I10 ESSENTIAL (PRIMARY) HYPERTENSION: ICD-10-CM

## 2022-09-09 DIAGNOSIS — E66.9 OBESITY, UNSPECIFIED: ICD-10-CM

## 2022-09-09 DIAGNOSIS — R73.09 OTHER ABNORMAL GLUCOSE: ICD-10-CM

## 2022-09-09 LAB
ALBUMIN SERPL BCG-MCNC: 5 G/DL (ref 3.5–5.2)
ALP SERPL-CCNC: 67 U/L (ref 40–129)
ALT SERPL W P-5'-P-CCNC: 26 U/L (ref 10–50)
ANION GAP SERPL CALCULATED.3IONS-SCNC: 10 MMOL/L (ref 7–15)
AST SERPL W P-5'-P-CCNC: 22 U/L (ref 10–50)
BILIRUB SERPL-MCNC: 0.6 MG/DL
BUN SERPL-MCNC: 20.4 MG/DL (ref 6–20)
CALCIUM SERPL-MCNC: 10.5 MG/DL (ref 8.6–10)
CHLORIDE SERPL-SCNC: 99 MMOL/L (ref 98–107)
CREAT SERPL-MCNC: 0.97 MG/DL (ref 0.67–1.17)
CRP SERPL-MCNC: <3 MG/L
DEPRECATED HCO3 PLAS-SCNC: 29 MMOL/L (ref 22–29)
GFR SERPL CREATININE-BSD FRML MDRD: >90 ML/MIN/1.73M2
GLUCOSE SERPL-MCNC: 82 MG/DL (ref 70–99)
INSULIN SERPL-ACNC: 25.2 UU/ML (ref 2.6–24.9)
POTASSIUM SERPL-SCNC: 4.8 MMOL/L (ref 3.4–5.3)
PROT SERPL-MCNC: 7.4 G/DL (ref 6.4–8.3)
SODIUM SERPL-SCNC: 138 MMOL/L (ref 136–145)

## 2022-09-09 PROCEDURE — 80053 COMPREHEN METABOLIC PANEL: CPT | Performed by: FAMILY MEDICINE

## 2022-09-09 PROCEDURE — 86140 C-REACTIVE PROTEIN: CPT | Performed by: FAMILY MEDICINE

## 2022-09-09 PROCEDURE — 83525 ASSAY OF INSULIN: CPT | Performed by: FAMILY MEDICINE

## 2022-09-09 PROCEDURE — G0103 PSA SCREENING: HCPCS | Performed by: FAMILY MEDICINE

## 2022-09-09 PROCEDURE — 84443 ASSAY THYROID STIM HORMONE: CPT | Performed by: FAMILY MEDICINE

## 2022-09-10 LAB
PSA SERPL-MCNC: 2.25 NG/ML (ref 0–3.5)
TSH SERPL DL<=0.005 MIU/L-ACNC: 3.65 UIU/ML (ref 0.3–4.2)

## 2022-11-24 DIAGNOSIS — I10 ESSENTIAL HYPERTENSION: ICD-10-CM

## 2022-11-25 NOTE — TELEPHONE ENCOUNTER
"Former patient of Daniel & has not established care with another provider.  Please assign refill request to covering provider per clinic standard process.    Routing refill request to provider for review/approval because:  Patient needs to be seen because it has been more than 1 year since last office visit.  BP not current  No PCP    Last Written Prescription Date:  8/26/22  Last Fill Quantity: 180,  # refills: 0   Last office visit provider:  4/9/21     Requested Prescriptions   Pending Prescriptions Disp Refills     carvedilol (COREG) 25 MG tablet [Pharmacy Med Name: CARVEDILOL TABS 25MG] 180 tablet 3     Sig: TAKE 1 TABLET TWICE A DAY WITH MEALS (NEED APPOINTMENT)       Beta-Blockers Protocol Failed - 11/25/2022  7:52 AM        Failed - Blood pressure under 140/90 in past 12 months     BP Readings from Last 3 Encounters:   04/09/21 138/88                 Failed - Recent (12 mo) or future (30 days) visit within the authorizing provider's specialty     Patient has had an office visit with the authorizing provider or a provider within the authorizing providers department within the previous 12 mos or has a future within next 30 days. See \"Patient Info\" tab in inbasket, or \"Choose Columns\" in Meds & Orders section of the refill encounter.              Passed - Patient is age 6 or older        Passed - Medication is active on med list             Armando Del Valle RN 11/25/22 7:52 AM  "

## 2022-11-28 ENCOUNTER — MYC MEDICAL ADVICE (OUTPATIENT)
Dept: FAMILY MEDICINE | Facility: CLINIC | Age: 57
End: 2022-11-28

## 2022-11-28 RX ORDER — CARVEDILOL 25 MG/1
TABLET ORAL
Qty: 180 TABLET | Refills: 0 | Status: SHIPPED | OUTPATIENT
Start: 2022-11-28

## 2022-11-28 NOTE — TELEPHONE ENCOUNTER
My chart message sent to pt to sched est care visit.  Last visit 4/9/21- pt was due for next visit April 2022.    Call and mychart msg sent 8/23/22 to est care with new provider as well.

## 2023-09-30 ENCOUNTER — HEALTH MAINTENANCE LETTER (OUTPATIENT)
Age: 58
End: 2023-09-30

## 2023-10-12 ENCOUNTER — LAB REQUISITION (OUTPATIENT)
Dept: LAB | Facility: CLINIC | Age: 58
End: 2023-10-12

## 2023-10-12 DIAGNOSIS — Z12.5 ENCOUNTER FOR SCREENING FOR MALIGNANT NEOPLASM OF PROSTATE: ICD-10-CM

## 2023-10-12 DIAGNOSIS — I10 ESSENTIAL (PRIMARY) HYPERTENSION: ICD-10-CM

## 2023-10-12 LAB
ALBUMIN SERPL BCG-MCNC: 4.9 G/DL (ref 3.5–5.2)
ALP SERPL-CCNC: 69 U/L (ref 40–129)
ALT SERPL W P-5'-P-CCNC: 27 U/L (ref 0–70)
ANION GAP SERPL CALCULATED.3IONS-SCNC: 12 MMOL/L (ref 7–15)
AST SERPL W P-5'-P-CCNC: 23 U/L (ref 0–45)
BILIRUB SERPL-MCNC: 0.6 MG/DL
BUN SERPL-MCNC: 22 MG/DL (ref 6–20)
CALCIUM SERPL-MCNC: 10.7 MG/DL (ref 8.6–10)
CHLORIDE SERPL-SCNC: 99 MMOL/L (ref 98–107)
CREAT SERPL-MCNC: 1.21 MG/DL (ref 0.67–1.17)
DEPRECATED HCO3 PLAS-SCNC: 28 MMOL/L (ref 22–29)
EGFRCR SERPLBLD CKD-EPI 2021: 69 ML/MIN/1.73M2
GLUCOSE SERPL-MCNC: 87 MG/DL (ref 70–99)
POTASSIUM SERPL-SCNC: 4.7 MMOL/L (ref 3.4–5.3)
PROT SERPL-MCNC: 7.7 G/DL (ref 6.4–8.3)
PSA SERPL DL<=0.01 NG/ML-MCNC: 2.29 NG/ML (ref 0–3.5)
SODIUM SERPL-SCNC: 139 MMOL/L (ref 135–145)
TSH SERPL DL<=0.005 MIU/L-ACNC: 3.4 UIU/ML (ref 0.3–4.2)

## 2023-10-12 PROCEDURE — G0103 PSA SCREENING: HCPCS | Performed by: FAMILY MEDICINE

## 2023-10-12 PROCEDURE — 80053 COMPREHEN METABOLIC PANEL: CPT | Performed by: FAMILY MEDICINE

## 2023-10-12 PROCEDURE — 84443 ASSAY THYROID STIM HORMONE: CPT | Performed by: FAMILY MEDICINE

## 2024-06-07 ENCOUNTER — LAB REQUISITION (OUTPATIENT)
Dept: LAB | Facility: CLINIC | Age: 59
End: 2024-06-07

## 2024-06-07 DIAGNOSIS — I10 ESSENTIAL (PRIMARY) HYPERTENSION: ICD-10-CM

## 2024-06-07 DIAGNOSIS — Z12.5 ENCOUNTER FOR SCREENING FOR MALIGNANT NEOPLASM OF PROSTATE: ICD-10-CM

## 2024-06-07 DIAGNOSIS — E83.52 HYPERCALCEMIA: ICD-10-CM

## 2024-06-07 DIAGNOSIS — R73.09 OTHER ABNORMAL GLUCOSE: ICD-10-CM

## 2024-06-07 PROCEDURE — 84443 ASSAY THYROID STIM HORMONE: CPT | Performed by: FAMILY MEDICINE

## 2024-06-07 PROCEDURE — G0103 PSA SCREENING: HCPCS | Performed by: FAMILY MEDICINE

## 2024-06-07 PROCEDURE — 84439 ASSAY OF FREE THYROXINE: CPT | Performed by: FAMILY MEDICINE

## 2024-06-07 PROCEDURE — 80053 COMPREHEN METABOLIC PANEL: CPT | Performed by: FAMILY MEDICINE

## 2024-06-07 PROCEDURE — 83525 ASSAY OF INSULIN: CPT | Performed by: FAMILY MEDICINE

## 2024-06-07 PROCEDURE — 83970 ASSAY OF PARATHORMONE: CPT | Performed by: FAMILY MEDICINE

## 2024-06-08 LAB
ALBUMIN SERPL BCG-MCNC: 5 G/DL (ref 3.5–5.2)
ALP SERPL-CCNC: 64 U/L (ref 40–150)
ALT SERPL W P-5'-P-CCNC: 25 U/L (ref 0–70)
ANION GAP SERPL CALCULATED.3IONS-SCNC: 11 MMOL/L (ref 7–15)
AST SERPL W P-5'-P-CCNC: 21 U/L (ref 0–45)
BILIRUB SERPL-MCNC: 0.6 MG/DL
BUN SERPL-MCNC: 21.3 MG/DL (ref 8–23)
CALCIUM SERPL-MCNC: 10.5 MG/DL (ref 8.6–10)
CHLORIDE SERPL-SCNC: 101 MMOL/L (ref 98–107)
CREAT SERPL-MCNC: 1.16 MG/DL (ref 0.67–1.17)
DEPRECATED HCO3 PLAS-SCNC: 28 MMOL/L (ref 22–29)
EGFRCR SERPLBLD CKD-EPI 2021: 73 ML/MIN/1.73M2
GLUCOSE SERPL-MCNC: 94 MG/DL (ref 70–99)
INSULIN SERPL-ACNC: 12.7 UU/ML (ref 2.6–24.9)
POTASSIUM SERPL-SCNC: 5.1 MMOL/L (ref 3.4–5.3)
PROT SERPL-MCNC: 7.6 G/DL (ref 6.4–8.3)
PSA SERPL DL<=0.01 NG/ML-MCNC: 2.41 NG/ML (ref 0–3.5)
PTH-INTACT SERPL-MCNC: 37 PG/ML (ref 15–65)
SODIUM SERPL-SCNC: 140 MMOL/L (ref 135–145)
T4 FREE SERPL-MCNC: 1.24 NG/DL (ref 0.9–1.7)
TSH SERPL DL<=0.005 MIU/L-ACNC: 4.38 UIU/ML (ref 0.3–4.2)

## 2024-09-12 ENCOUNTER — LAB REQUISITION (OUTPATIENT)
Dept: LAB | Facility: CLINIC | Age: 59
End: 2024-09-12

## 2024-09-12 DIAGNOSIS — R39.89 OTHER SYMPTOMS AND SIGNS INVOLVING THE GENITOURINARY SYSTEM: ICD-10-CM

## 2024-09-12 DIAGNOSIS — E83.52 HYPERCALCEMIA: ICD-10-CM

## 2024-09-12 DIAGNOSIS — I10 ESSENTIAL (PRIMARY) HYPERTENSION: ICD-10-CM

## 2024-09-12 LAB — CA-I BLD-MCNC: 5.1 MG/DL (ref 4.4–5.2)

## 2024-09-12 PROCEDURE — 84443 ASSAY THYROID STIM HORMONE: CPT | Performed by: FAMILY MEDICINE

## 2024-09-12 PROCEDURE — 84439 ASSAY OF FREE THYROXINE: CPT | Performed by: FAMILY MEDICINE

## 2024-09-12 PROCEDURE — 82330 ASSAY OF CALCIUM: CPT | Performed by: FAMILY MEDICINE

## 2024-09-12 PROCEDURE — 87086 URINE CULTURE/COLONY COUNT: CPT | Performed by: FAMILY MEDICINE

## 2024-09-13 LAB
T4 FREE SERPL-MCNC: 1.35 NG/DL (ref 0.9–1.7)
TSH SERPL DL<=0.005 MIU/L-ACNC: 2.89 UIU/ML (ref 0.3–4.2)

## 2024-09-14 LAB — BACTERIA UR CULT: NO GROWTH
